# Patient Record
Sex: FEMALE | Race: WHITE | NOT HISPANIC OR LATINO | Employment: OTHER | URBAN - METROPOLITAN AREA
[De-identification: names, ages, dates, MRNs, and addresses within clinical notes are randomized per-mention and may not be internally consistent; named-entity substitution may affect disease eponyms.]

---

## 2023-02-20 PROBLEM — H02.829 CYST, EYELID: Status: ACTIVE | Noted: 2023-02-20

## 2023-02-20 PROBLEM — Z78.0 POSTMENOPAUSAL: Status: ACTIVE | Noted: 2023-02-20

## 2023-02-20 PROBLEM — K21.9 GERD (GASTROESOPHAGEAL REFLUX DISEASE): Status: ACTIVE | Noted: 2023-02-20

## 2023-02-20 PROBLEM — K44.9 HIATAL HERNIA: Status: ACTIVE | Noted: 2023-02-20

## 2023-02-20 PROBLEM — L25.5 RHUS DERMATITIS: Status: ACTIVE | Noted: 2023-02-20

## 2023-02-20 PROBLEM — G43.909 MIGRAINE: Status: ACTIVE | Noted: 2023-02-20

## 2023-02-20 PROBLEM — K63.5 COLON POLYP: Status: ACTIVE | Noted: 2023-02-20

## 2023-02-20 PROBLEM — E66.3 OVERWEIGHT WITH BODY MASS INDEX (BMI) OF 28 TO 28.9 IN ADULT: Status: ACTIVE | Noted: 2023-02-20

## 2023-02-20 PROBLEM — H61.20 CERUMEN IMPACTION: Status: ACTIVE | Noted: 2023-02-20

## 2023-02-20 PROBLEM — L55.9 SUNBURN, UNSPECIFIED: Status: ACTIVE | Noted: 2023-02-20

## 2023-02-20 PROBLEM — L55.9 SUNBURN, UNSPECIFIED: Status: RESOLVED | Noted: 2023-02-20 | Resolved: 2023-02-20

## 2023-02-20 PROBLEM — F41.9 ACUTE ANXIETY: Status: ACTIVE | Noted: 2023-02-20

## 2023-02-20 PROBLEM — I10 HYPERTENSION: Status: ACTIVE | Noted: 2023-02-20

## 2023-02-20 PROBLEM — E55.9 VITAMIN D DEFICIENCY: Status: ACTIVE | Noted: 2023-02-20

## 2023-02-20 PROBLEM — E78.5 HYPERLIPIDEMIA: Status: ACTIVE | Noted: 2023-02-20

## 2023-02-20 PROBLEM — T78.40XA ALLERGIC REACTION: Status: ACTIVE | Noted: 2023-02-20

## 2023-02-20 RX ORDER — PRAVASTATIN SODIUM 80 MG/1
TABLET ORAL
COMMUNITY
Start: 2021-09-20 | End: 2023-03-21 | Stop reason: ALTCHOICE

## 2023-02-20 RX ORDER — ESCITALOPRAM OXALATE 5 MG/1
TABLET ORAL
COMMUNITY
Start: 2020-03-30 | End: 2023-03-21 | Stop reason: SDUPTHER

## 2023-02-20 RX ORDER — HYDROCHLOROTHIAZIDE 25 MG/1
TABLET ORAL
COMMUNITY
Start: 2021-10-21 | End: 2023-09-21 | Stop reason: WASHOUT

## 2023-02-20 RX ORDER — BIOTIN 5 MG
TABLET ORAL
COMMUNITY
End: 2023-07-18 | Stop reason: WASHOUT

## 2023-02-20 RX ORDER — TOBRAMYCIN 3 MG/ML
SOLUTION/ DROPS OPHTHALMIC
COMMUNITY
Start: 2020-09-15 | End: 2023-11-21 | Stop reason: ALTCHOICE

## 2023-02-20 RX ORDER — ELETRIPTAN HYDROBROMIDE 40 MG/1
TABLET, FILM COATED ORAL
COMMUNITY
Start: 2018-02-01 | End: 2023-09-21 | Stop reason: WASHOUT

## 2023-02-20 RX ORDER — HYDROGEN PEROXIDE 3 %
SOLUTION, NON-ORAL MISCELLANEOUS
COMMUNITY
Start: 2018-08-02 | End: 2023-11-21 | Stop reason: ALTCHOICE

## 2023-02-20 RX ORDER — AMLODIPINE BESYLATE 5 MG/1
TABLET ORAL
COMMUNITY
End: 2023-03-21 | Stop reason: SDUPTHER

## 2023-03-21 ENCOUNTER — OFFICE VISIT (OUTPATIENT)
Dept: PRIMARY CARE | Facility: CLINIC | Age: 68
End: 2023-03-21
Payer: MEDICARE

## 2023-03-21 DIAGNOSIS — Z78.0 ASYMPTOMATIC MENOPAUSAL STATE: ICD-10-CM

## 2023-03-21 DIAGNOSIS — F32.89 OTHER DEPRESSION: ICD-10-CM

## 2023-03-21 DIAGNOSIS — Z11.59 NEED FOR HEPATITIS C SCREENING TEST: ICD-10-CM

## 2023-03-21 DIAGNOSIS — Z00.00 ROUTINE GENERAL MEDICAL EXAMINATION AT HEALTH CARE FACILITY: Primary | ICD-10-CM

## 2023-03-21 DIAGNOSIS — E78.2 MIXED HYPERLIPIDEMIA: ICD-10-CM

## 2023-03-21 PROCEDURE — 1160F RVW MEDS BY RX/DR IN RCRD: CPT | Performed by: INTERNAL MEDICINE

## 2023-03-21 PROCEDURE — 1170F FXNL STATUS ASSESSED: CPT | Performed by: INTERNAL MEDICINE

## 2023-03-21 PROCEDURE — G0439 PPPS, SUBSEQ VISIT: HCPCS | Performed by: INTERNAL MEDICINE

## 2023-03-21 PROCEDURE — 3080F DIAST BP >= 90 MM HG: CPT | Performed by: INTERNAL MEDICINE

## 2023-03-21 PROCEDURE — 1036F TOBACCO NON-USER: CPT | Performed by: INTERNAL MEDICINE

## 2023-03-21 PROCEDURE — 3077F SYST BP >= 140 MM HG: CPT | Performed by: INTERNAL MEDICINE

## 2023-03-21 PROCEDURE — 1159F MED LIST DOCD IN RCRD: CPT | Performed by: INTERNAL MEDICINE

## 2023-03-21 RX ORDER — PHENYLPROPANOLAMINE/CLEMASTINE 75-1.34MG
400 TABLET, EXTENDED RELEASE ORAL EVERY 6 HOURS PRN
COMMUNITY

## 2023-03-21 RX ORDER — ESCITALOPRAM OXALATE 5 MG/1
1 TABLET ORAL DAILY
COMMUNITY
Start: 2020-03-30 | End: 2023-03-21 | Stop reason: SDUPTHER

## 2023-03-21 RX ORDER — ATORVASTATIN CALCIUM 80 MG/1
TABLET, FILM COATED ORAL
Qty: 30 TABLET | Refills: 2 | Status: SHIPPED | OUTPATIENT
Start: 2023-03-21 | End: 2023-06-21 | Stop reason: SDUPTHER

## 2023-03-21 RX ORDER — ESCITALOPRAM OXALATE 5 MG/1
5 TABLET ORAL DAILY
Qty: 90 TABLET | Refills: 1 | Status: SHIPPED | OUTPATIENT
Start: 2023-03-21 | End: 2023-09-21 | Stop reason: WASHOUT

## 2023-03-21 RX ORDER — ATORVASTATIN CALCIUM 10 MG/1
80 TABLET, FILM COATED ORAL DAILY
Qty: 240 TABLET | Refills: 2 | Status: SHIPPED | OUTPATIENT
Start: 2023-03-21 | End: 2023-03-21

## 2023-03-21 RX ORDER — CALCIUM CARBONATE 600 MG
600 TABLET ORAL
COMMUNITY
Start: 2021-04-14 | End: 2023-09-21 | Stop reason: WASHOUT

## 2023-03-21 RX ORDER — PRAVASTATIN SODIUM 80 MG/1
80 TABLET ORAL DAILY
COMMUNITY
Start: 2021-09-20 | End: 2023-03-21 | Stop reason: ALTCHOICE

## 2023-03-21 RX ORDER — HYDROGEN PEROXIDE 3 %
SOLUTION, NON-ORAL MISCELLANEOUS
COMMUNITY
Start: 2018-08-02 | End: 2023-09-21 | Stop reason: WASHOUT

## 2023-03-21 RX ORDER — AMLODIPINE BESYLATE 5 MG/1
1 TABLET ORAL DAILY
COMMUNITY
Start: 2023-02-13 | End: 2023-09-21 | Stop reason: SDUPTHER

## 2023-03-21 ASSESSMENT — ACTIVITIES OF DAILY LIVING (ADL)
PATIENT'S MEMORY ADEQUATE TO SAFELY COMPLETE DAILY ACTIVITIES?: YES
HEARING - RIGHT EAR: FUNCTIONAL
GROOMING: INDEPENDENT
BATHING: INDEPENDENT
DRESSING YOURSELF: INDEPENDENT
MANAGING_FINANCES: INDEPENDENT
TOILETING: INDEPENDENT
FEEDING YOURSELF: INDEPENDENT
DOING_HOUSEWORK: INDEPENDENT
JUDGMENT_ADEQUATE_SAFELY_COMPLETE_DAILY_ACTIVITIES: YES
TAKING_MEDICATION: INDEPENDENT
HEARING - LEFT EAR: FUNCTIONAL
BATHING: INDEPENDENT
WALKS IN HOME: INDEPENDENT
DRESSING: INDEPENDENT
ADEQUATE_TO_COMPLETE_ADL: YES
GROCERY_SHOPPING: INDEPENDENT

## 2023-03-21 ASSESSMENT — PATIENT HEALTH QUESTIONNAIRE - PHQ9
2. FEELING DOWN, DEPRESSED OR HOPELESS: NOT AT ALL
2. FEELING DOWN, DEPRESSED OR HOPELESS: NOT AT ALL
SUM OF ALL RESPONSES TO PHQ9 QUESTIONS 1 AND 2: 0
1. LITTLE INTEREST OR PLEASURE IN DOING THINGS: NOT AT ALL
SUM OF ALL RESPONSES TO PHQ9 QUESTIONS 1 AND 2: 0
1. LITTLE INTEREST OR PLEASURE IN DOING THINGS: NOT AT ALL

## 2023-03-21 ASSESSMENT — COLUMBIA-SUICIDE SEVERITY RATING SCALE - C-SSRS
6. HAVE YOU EVER DONE ANYTHING, STARTED TO DO ANYTHING, OR PREPARED TO DO ANYTHING TO END YOUR LIFE?: NO
2. HAVE YOU ACTUALLY HAD ANY THOUGHTS OF KILLING YOURSELF?: NO
1. IN THE PAST MONTH, HAVE YOU WISHED YOU WERE DEAD OR WISHED YOU COULD GO TO SLEEP AND NOT WAKE UP?: NO

## 2023-03-21 ASSESSMENT — ENCOUNTER SYMPTOMS
OCCASIONAL FEELINGS OF UNSTEADINESS: 0
DEPRESSION: 0
LOSS OF SENSATION IN FEET: 0

## 2023-03-21 ASSESSMENT — PAIN SCALES - GENERAL: PAINLEVEL: 0-NO PAIN

## 2023-03-22 VITALS
OXYGEN SATURATION: 99 % | DIASTOLIC BLOOD PRESSURE: 94 MMHG | WEIGHT: 161 LBS | TEMPERATURE: 96.7 F | BODY MASS INDEX: 27.49 KG/M2 | SYSTOLIC BLOOD PRESSURE: 146 MMHG | HEIGHT: 64 IN | HEART RATE: 63 BPM

## 2023-03-25 PROBLEM — E66.3 OVERWEIGHT WITH BODY MASS INDEX (BMI) OF 28 TO 28.9 IN ADULT: Status: RESOLVED | Noted: 2023-02-20 | Resolved: 2023-03-25

## 2023-03-25 ASSESSMENT — ENCOUNTER SYMPTOMS
PALPITATIONS: 0
WOUND: 0
NERVOUS/ANXIOUS: 0
EYE PAIN: 0
POLYPHAGIA: 0
ACTIVITY CHANGE: 0
DIZZINESS: 0
STRIDOR: 0
NAUSEA: 0
DIARRHEA: 0
VOMITING: 0
PHOTOPHOBIA: 0
WHEEZING: 0
WEAKNESS: 0
VOICE CHANGE: 0
SLEEP DISTURBANCE: 0
BLOOD IN STOOL: 0
ADENOPATHY: 0
NUMBNESS: 0
ABDOMINAL PAIN: 0
FLANK PAIN: 0
SHORTNESS OF BREATH: 0
CONFUSION: 0
FATIGUE: 0
UNEXPECTED WEIGHT CHANGE: 0
BACK PAIN: 0
MYALGIAS: 0
SPEECH DIFFICULTY: 0
CONSTIPATION: 0
TROUBLE SWALLOWING: 0
APPETITE CHANGE: 0
CHEST TIGHTNESS: 0
SEIZURES: 0
NECK PAIN: 0
FACIAL ASYMMETRY: 0
SORE THROAT: 0
FEVER: 0
HEADACHES: 0
POLYDIPSIA: 0
SINUS PAIN: 0
HALLUCINATIONS: 0
DYSURIA: 0

## 2023-03-26 NOTE — PROGRESS NOTES
"Subjective   Reason for Visit: Rebeca Penn is an 67 y.o. female here for a Medicare Wellness visit.     HPI  Patient here for Annual Medicare wellness visit and also need refill on Escitalopram for Depression.    Patient Care Team:  Dino Meyer MD as PCP - General  Dino Meyer MD as PCP - [facility] Attributed Provider     Review of Systems   Constitutional:  Negative for activity change, appetite change, fatigue, fever and unexpected weight change.   HENT:  Negative for dental problem, ear discharge, hearing loss, nosebleeds, postnasal drip, sinus pain, sore throat, trouble swallowing and voice change.    Eyes:  Negative for photophobia, pain and visual disturbance.   Respiratory:  Negative for chest tightness, shortness of breath, wheezing and stridor.    Cardiovascular:  Negative for chest pain, palpitations and leg swelling.   Gastrointestinal:  Negative for abdominal pain, blood in stool, constipation, diarrhea, nausea and vomiting.   Endocrine: Negative for polydipsia, polyphagia and polyuria.   Genitourinary:  Negative for decreased urine volume, dyspareunia, dysuria, flank pain and urgency.   Musculoskeletal:  Negative for back pain, gait problem, myalgias and neck pain.   Skin:  Negative for rash and wound.   Allergic/Immunologic: Negative for environmental allergies and food allergies.   Neurological:  Negative for dizziness, seizures, syncope, facial asymmetry, speech difficulty, weakness, numbness and headaches.   Hematological:  Negative for adenopathy.   Psychiatric/Behavioral:  Negative for behavioral problems, confusion, hallucinations, sleep disturbance and suicidal ideas. The patient is not nervous/anxious.      Objective   Vitals:  BP (!) 146/94   Pulse 63   Temp 35.9 °C (96.7 °F)   Ht 1.63 m (5' 4.17\")   Wt 73 kg (161 lb)   SpO2 99%   BMI 27.49 kg/m²       Physical Exam  Constitutional:       General: She is not in acute distress.     Appearance: Normal appearance. She is not ill-appearing " or toxic-appearing.   HENT:      Head: Normocephalic and atraumatic.      Nose: Nose normal.   Eyes:      Extraocular Movements: Extraocular movements intact.      Conjunctiva/sclera: Conjunctivae normal.      Pupils: Pupils are equal, round, and reactive to light.   Cardiovascular:      Rate and Rhythm: Normal rate and regular rhythm.      Pulses: Normal pulses.      Heart sounds: Normal heart sounds. No murmur heard.     No gallop.   Pulmonary:      Effort: No respiratory distress.      Breath sounds: No stridor. No wheezing or rales.   Abdominal:      General: There is no distension.      Tenderness: There is no abdominal tenderness. There is no right CVA tenderness, left CVA tenderness, guarding or rebound.   Musculoskeletal:         General: No swelling or deformity. Normal range of motion.      Cervical back: Normal range of motion and neck supple. No rigidity or tenderness.      Right lower leg: No edema.      Left lower leg: No edema.   Skin:     General: Skin is warm.      Coloration: Skin is not jaundiced.      Findings: No bruising, erythema or rash.   Neurological:      General: No focal deficit present.      Mental Status: She is alert and oriented to person, place, and time.      Cranial Nerves: No cranial nerve deficit.      Gait: Gait normal.   Psychiatric:         Mood and Affect: Mood normal.         Behavior: Behavior normal.         Thought Content: Thought content normal.         Judgment: Judgment normal.       Assessment/Plan   Problem List Items Addressed This Visit          Other    Hyperlipidemia    Relevant Orders    Lipid Panel     Other Visit Diagnoses       Routine general medical examination at health care facility    -  Primary    Need for hepatitis C screening test        Relevant Orders    Hepatitis C antibody    Asymptomatic menopausal state        Relevant Orders    XR DEXA bone density    Other depression        Relevant Medications    escitalopram (Lexapro) 5 mg tablet

## 2023-05-15 ENCOUNTER — TELEPHONE (OUTPATIENT)
Dept: PRIMARY CARE | Facility: CLINIC | Age: 68
End: 2023-05-15
Payer: MEDICARE

## 2023-05-15 NOTE — TELEPHONE ENCOUNTER
"Patient calling in reporting that she has been having itchy skin since being on the Amlodipine back in February. She states that she has been \"putting up with it\" until the last couple of days, she has been having bumps on her skin appearing. She is worried that she may be having an allergic reaction again to the amlodipine. She reports that she has been on the amlodipine 10 mg in the past and ended up having a more severe itching and hives from it.   "

## 2023-05-15 NOTE — TELEPHONE ENCOUNTER
Yes ask her to stop the medicine. It looks like she is having allergic reaction to medication. When she will come to office I will see if I can given alternative medicine.

## 2023-05-16 NOTE — TELEPHONE ENCOUNTER
Patient called back in and states that she forgot that she started taking Biotin 3 weeks ago and after looking it up, she had discovered that some of the side effects of Biotin could be rashes/hives. Patient is going discontinue the Biotin and see if the rash goes away. Patient will call the office back if the rash does not go away to get scheduled with Dr. Meyer to discuss changing Amlodipine.

## 2023-06-21 DIAGNOSIS — E78.2 MIXED HYPERLIPIDEMIA: ICD-10-CM

## 2023-06-21 PROBLEM — R03.0 ELEVATED BLOOD PRESSURE, SITUATIONAL: Status: ACTIVE | Noted: 2023-06-21

## 2023-06-21 PROBLEM — M25.511 PAIN IN RIGHT SHOULDER: Status: ACTIVE | Noted: 2023-06-21

## 2023-06-21 PROBLEM — H57.89 IRRITATION OF RIGHT EYE: Status: ACTIVE | Noted: 2023-06-21

## 2023-06-21 RX ORDER — ATORVASTATIN CALCIUM 80 MG/1
80 TABLET, FILM COATED ORAL DAILY
Qty: 90 TABLET | Refills: 0 | Status: SHIPPED | OUTPATIENT
Start: 2023-06-21 | End: 2023-09-21 | Stop reason: SDUPTHER

## 2023-07-18 ENCOUNTER — OFFICE VISIT (OUTPATIENT)
Dept: PRIMARY CARE | Facility: CLINIC | Age: 68
End: 2023-07-18
Payer: MEDICARE

## 2023-07-18 VITALS
DIASTOLIC BLOOD PRESSURE: 78 MMHG | HEIGHT: 64 IN | SYSTOLIC BLOOD PRESSURE: 118 MMHG | BODY MASS INDEX: 28.34 KG/M2 | OXYGEN SATURATION: 98 % | WEIGHT: 166 LBS | TEMPERATURE: 96.5 F | HEART RATE: 73 BPM

## 2023-07-18 DIAGNOSIS — J40 BRONCHITIS: Primary | ICD-10-CM

## 2023-07-18 PROBLEM — Z78.0 POSTMENOPAUSAL: Status: RESOLVED | Noted: 2023-02-20 | Resolved: 2023-07-18

## 2023-07-18 PROCEDURE — 3078F DIAST BP <80 MM HG: CPT | Performed by: INTERNAL MEDICINE

## 2023-07-18 PROCEDURE — 1160F RVW MEDS BY RX/DR IN RCRD: CPT | Performed by: INTERNAL MEDICINE

## 2023-07-18 PROCEDURE — 3074F SYST BP LT 130 MM HG: CPT | Performed by: INTERNAL MEDICINE

## 2023-07-18 PROCEDURE — 1126F AMNT PAIN NOTED NONE PRSNT: CPT | Performed by: INTERNAL MEDICINE

## 2023-07-18 PROCEDURE — 99212 OFFICE O/P EST SF 10 MIN: CPT | Performed by: INTERNAL MEDICINE

## 2023-07-18 PROCEDURE — 1159F MED LIST DOCD IN RCRD: CPT | Performed by: INTERNAL MEDICINE

## 2023-07-18 PROCEDURE — 1036F TOBACCO NON-USER: CPT | Performed by: INTERNAL MEDICINE

## 2023-07-18 RX ORDER — AZITHROMYCIN 250 MG/1
TABLET, FILM COATED ORAL
Qty: 6 TABLET | Refills: 0 | Status: SHIPPED | OUTPATIENT
Start: 2023-07-18 | End: 2023-07-23

## 2023-07-18 ASSESSMENT — ENCOUNTER SYMPTOMS
COUGH: 1
ADENOPATHY: 0
BLOOD IN STOOL: 0
MYALGIAS: 1
EYE PAIN: 0
POLYDIPSIA: 0
DYSURIA: 0
CHEST TIGHTNESS: 0
HEMATURIA: 0
SHORTNESS OF BREATH: 1
BACK PAIN: 0
FEVER: 0
WHEEZING: 1
NUMBNESS: 0
SLEEP DISTURBANCE: 0
NECK PAIN: 0
TROUBLE SWALLOWING: 0
VOMITING: 0
DIZZINESS: 0
SEIZURES: 0
DIARRHEA: 0
UNEXPECTED WEIGHT CHANGE: 0
VOICE CHANGE: 0
FATIGUE: 1
ACTIVITY CHANGE: 0
SORE THROAT: 0
WOUND: 0
NERVOUS/ANXIOUS: 0
STRIDOR: 0
HALLUCINATIONS: 0
FLANK PAIN: 0
CONSTIPATION: 0
APPETITE CHANGE: 0
WEAKNESS: 1
PHOTOPHOBIA: 0
SPEECH DIFFICULTY: 0
PALPITATIONS: 0
FACIAL ASYMMETRY: 0
ABDOMINAL PAIN: 0
HEADACHES: 0
CONFUSION: 0
SINUS PAIN: 0
NAUSEA: 0
POLYPHAGIA: 0

## 2023-07-18 ASSESSMENT — PAIN SCALES - GENERAL: PAINLEVEL: 0-NO PAIN

## 2023-07-18 NOTE — PROGRESS NOTES
"Subjective   Patient ID: Rebeca Penn is a 67 y.o. female who presents for Cough (Coughing, wheezing for the last week).    HPI   Patient have one week history of Cough and wheezing which is not improving. Her daughter visited couple of weeks ago and she had walking pneumonia. Otherwise she does not recall any exposure.  Cough is intermittent but frequent with occasional sputum.    Review of Systems   Constitutional:  Positive for fatigue. Negative for activity change, appetite change, fever and unexpected weight change.   HENT:  Negative for dental problem, ear discharge, hearing loss, nosebleeds, postnasal drip, sinus pain, sore throat, trouble swallowing and voice change.    Eyes:  Negative for photophobia, pain and visual disturbance.   Respiratory:  Positive for cough, shortness of breath and wheezing. Negative for chest tightness and stridor.    Cardiovascular:  Negative for chest pain, palpitations and leg swelling.   Gastrointestinal:  Negative for abdominal pain, blood in stool, constipation, diarrhea, nausea and vomiting.   Endocrine: Negative for polydipsia, polyphagia and polyuria.   Genitourinary:  Negative for decreased urine volume, dyspareunia, dysuria, flank pain, hematuria and urgency.   Musculoskeletal:  Positive for myalgias. Negative for back pain, gait problem and neck pain.   Skin:  Negative for rash and wound.   Allergic/Immunologic: Negative for environmental allergies and food allergies.   Neurological:  Positive for weakness. Negative for dizziness, seizures, syncope, facial asymmetry, speech difficulty, numbness and headaches.   Hematological:  Negative for adenopathy.   Psychiatric/Behavioral:  Negative for behavioral problems, confusion, hallucinations, sleep disturbance and suicidal ideas. The patient is not nervous/anxious.        Objective   /78   Pulse 73   Temp 35.8 °C (96.5 °F)   Ht 1.626 m (5' 4\")   Wt 75.3 kg (166 lb)   SpO2 98%   BMI 28.49 kg/m²     Physical " Exam  Constitutional:       General: She is not in acute distress.     Appearance: Normal appearance. She is not ill-appearing or toxic-appearing.   HENT:      Head: Normocephalic.   Eyes:      General:         Right eye: No discharge.         Left eye: No discharge.      Conjunctiva/sclera: Conjunctivae normal.   Cardiovascular:      Rate and Rhythm: Normal rate and regular rhythm.      Pulses: Normal pulses.      Heart sounds: Normal heart sounds. No murmur heard.  Pulmonary:      Effort: Pulmonary effort is normal. No respiratory distress.      Breath sounds: No stridor. No wheezing or rales.      Comments: Bilateral diffuse bronchial breath sounds are present  Abdominal:      General: Bowel sounds are normal. There is no distension.      Palpations: Abdomen is soft.      Tenderness: There is no abdominal tenderness. There is no right CVA tenderness, left CVA tenderness, guarding or rebound.   Musculoskeletal:         General: No deformity. Normal range of motion.      Cervical back: Normal range of motion.   Skin:     General: Skin is warm.   Neurological:      General: No focal deficit present.      Mental Status: She is alert and oriented to person, place, and time.   Psychiatric:         Mood and Affect: Mood normal.         Behavior: Behavior normal.         Thought Content: Thought content normal.         Judgment: Judgment normal.       Assessment/Plan   Problem List Items Addressed This Visit    None  Visit Diagnoses       Bronchitis    -  Primary    Relevant Medications    azithromycin (Zithromax) 250 mg tablet

## 2023-09-13 ENCOUNTER — LAB (OUTPATIENT)
Dept: LAB | Facility: LAB | Age: 68
End: 2023-09-13
Payer: MEDICARE

## 2023-09-13 DIAGNOSIS — Z11.59 NEED FOR HEPATITIS C SCREENING TEST: ICD-10-CM

## 2023-09-13 DIAGNOSIS — E78.2 MIXED HYPERLIPIDEMIA: ICD-10-CM

## 2023-09-13 LAB
CHOLESTEROL (MG/DL) IN SER/PLAS: 200 MG/DL (ref 0–199)
CHOLESTEROL IN HDL (MG/DL) IN SER/PLAS: 52.4 MG/DL
CHOLESTEROL/HDL RATIO: 3.8
HEPATITIS C VIRUS AB PRESENCE IN SERUM: NONREACTIVE
LDL: 117 MG/DL (ref 0–99)
TRIGLYCERIDE (MG/DL) IN SER/PLAS: 154 MG/DL (ref 0–149)
VLDL: 31 MG/DL (ref 0–40)

## 2023-09-13 PROCEDURE — 86803 HEPATITIS C AB TEST: CPT

## 2023-09-13 PROCEDURE — 80061 LIPID PANEL: CPT

## 2023-09-13 PROCEDURE — 36415 COLL VENOUS BLD VENIPUNCTURE: CPT

## 2023-09-14 DIAGNOSIS — Z12.31 BREAST CANCER SCREENING BY MAMMOGRAM: Primary | ICD-10-CM

## 2023-09-21 ENCOUNTER — OFFICE VISIT (OUTPATIENT)
Dept: PRIMARY CARE | Facility: CLINIC | Age: 68
End: 2023-09-21
Payer: MEDICARE

## 2023-09-21 VITALS
OXYGEN SATURATION: 97 % | WEIGHT: 168 LBS | BODY MASS INDEX: 28.68 KG/M2 | SYSTOLIC BLOOD PRESSURE: 130 MMHG | HEIGHT: 64 IN | DIASTOLIC BLOOD PRESSURE: 82 MMHG | HEART RATE: 70 BPM | TEMPERATURE: 96.8 F

## 2023-09-21 DIAGNOSIS — E66.3 OVERWEIGHT WITH BODY MASS INDEX (BMI) OF 28 TO 28.9 IN ADULT: ICD-10-CM

## 2023-09-21 DIAGNOSIS — K21.9 GASTROESOPHAGEAL REFLUX DISEASE WITHOUT ESOPHAGITIS: ICD-10-CM

## 2023-09-21 DIAGNOSIS — E78.2 MIXED HYPERLIPIDEMIA: ICD-10-CM

## 2023-09-21 DIAGNOSIS — H61.23 BILATERAL IMPACTED CERUMEN: ICD-10-CM

## 2023-09-21 DIAGNOSIS — I10 PRIMARY HYPERTENSION: Primary | ICD-10-CM

## 2023-09-21 PROBLEM — M25.511 PAIN IN RIGHT SHOULDER: Status: RESOLVED | Noted: 2023-06-21 | Resolved: 2023-09-21

## 2023-09-21 PROBLEM — R03.0 ELEVATED BLOOD PRESSURE, SITUATIONAL: Status: RESOLVED | Noted: 2023-06-21 | Resolved: 2023-09-21

## 2023-09-21 PROBLEM — H61.20 CERUMEN IMPACTION: Status: RESOLVED | Noted: 2023-02-20 | Resolved: 2023-09-21

## 2023-09-21 PROBLEM — G43.909 MIGRAINE: Status: RESOLVED | Noted: 2023-02-20 | Resolved: 2023-09-21

## 2023-09-21 PROCEDURE — 1036F TOBACCO NON-USER: CPT | Performed by: INTERNAL MEDICINE

## 2023-09-21 PROCEDURE — 3008F BODY MASS INDEX DOCD: CPT | Performed by: INTERNAL MEDICINE

## 2023-09-21 PROCEDURE — 1160F RVW MEDS BY RX/DR IN RCRD: CPT | Performed by: INTERNAL MEDICINE

## 2023-09-21 PROCEDURE — 99214 OFFICE O/P EST MOD 30 MIN: CPT | Performed by: INTERNAL MEDICINE

## 2023-09-21 PROCEDURE — 3075F SYST BP GE 130 - 139MM HG: CPT | Performed by: INTERNAL MEDICINE

## 2023-09-21 PROCEDURE — 3079F DIAST BP 80-89 MM HG: CPT | Performed by: INTERNAL MEDICINE

## 2023-09-21 PROCEDURE — 1159F MED LIST DOCD IN RCRD: CPT | Performed by: INTERNAL MEDICINE

## 2023-09-21 PROCEDURE — 1126F AMNT PAIN NOTED NONE PRSNT: CPT | Performed by: INTERNAL MEDICINE

## 2023-09-21 RX ORDER — AMLODIPINE BESYLATE 5 MG/1
5 TABLET ORAL DAILY
Qty: 90 TABLET | Refills: 1 | Status: SHIPPED | OUTPATIENT
Start: 2023-09-21 | End: 2024-04-26 | Stop reason: SDUPTHER

## 2023-09-21 RX ORDER — ATORVASTATIN CALCIUM 80 MG/1
80 TABLET, FILM COATED ORAL DAILY
Qty: 90 TABLET | Refills: 0 | Status: SHIPPED | OUTPATIENT
Start: 2023-09-21 | End: 2024-01-23 | Stop reason: SDUPTHER

## 2023-09-21 ASSESSMENT — ENCOUNTER SYMPTOMS
HALLUCINATIONS: 0
PHOTOPHOBIA: 0
ADENOPATHY: 0
BLOOD IN STOOL: 0
BACK PAIN: 0
ACTIVITY CHANGE: 0
FATIGUE: 0
HEMATURIA: 0
TROUBLE SWALLOWING: 0
NAUSEA: 0
APPETITE CHANGE: 0
SLEEP DISTURBANCE: 0
NUMBNESS: 0
COUGH: 1
DIARRHEA: 0
FREQUENCY: 0
SINUS PAIN: 0
DIZZINESS: 0
CHEST TIGHTNESS: 0
NERVOUS/ANXIOUS: 0
POLYPHAGIA: 0
ABDOMINAL PAIN: 0
SPEECH DIFFICULTY: 0
DYSURIA: 0
SHORTNESS OF BREATH: 0
STRIDOR: 0
CONSTIPATION: 0
UNEXPECTED WEIGHT CHANGE: 0
FLANK PAIN: 0
POLYDIPSIA: 0
FEVER: 0
FACIAL ASYMMETRY: 0
WHEEZING: 0
VOMITING: 0
WEAKNESS: 0
SEIZURES: 0
MYALGIAS: 0
VOICE CHANGE: 0
CONFUSION: 0
COLOR CHANGE: 0
WOUND: 0
NECK PAIN: 0
EYE PAIN: 0
PALPITATIONS: 0
TREMORS: 0
SORE THROAT: 0
ARTHRALGIAS: 0
HEADACHES: 0
JOINT SWELLING: 0

## 2023-09-21 ASSESSMENT — PATIENT HEALTH QUESTIONNAIRE - PHQ9
1. LITTLE INTEREST OR PLEASURE IN DOING THINGS: NOT AT ALL
2. FEELING DOWN, DEPRESSED OR HOPELESS: NOT AT ALL
SUM OF ALL RESPONSES TO PHQ9 QUESTIONS 1 & 2: 0

## 2023-09-21 NOTE — ASSESSMENT & PLAN NOTE
Patient is on Nexium every other day. She is taking it every other day and symptoms are controlled.

## 2023-09-21 NOTE — ASSESSMENT & PLAN NOTE
Bilateral ear flushing attempted today. Cerumen is hard so its unsuccessful, Patient asked to use wax dissolving ear drop and asked her to come back in 5 days.

## 2023-09-21 NOTE — PROGRESS NOTES
"Subjective   Patient ID: Rebeca Penn is a 68 y.o. female who presents for Follow-up (Medicine refill and recently had labs) and Ear Fullness (Right sided ear fullness).    HPI   Patient presented to the office for refill on medicine and to discuss labs which she recently had last week.  Also patient has right side ear fullness without ear pain and discharge.    Review of Systems   Constitutional:  Negative for activity change, appetite change, fatigue, fever and unexpected weight change.   HENT:  Negative for dental problem, ear discharge, hearing loss, nosebleeds, postnasal drip, sinus pain, sore throat, trouble swallowing and voice change.         Right sided ear fullness   Eyes:  Negative for photophobia, pain and visual disturbance.   Respiratory:  Positive for cough. Negative for chest tightness, shortness of breath, wheezing and stridor.    Cardiovascular:  Negative for chest pain, palpitations and leg swelling.   Gastrointestinal:  Negative for abdominal pain, blood in stool, constipation, diarrhea, nausea and vomiting.   Endocrine: Negative for polydipsia, polyphagia and polyuria.   Genitourinary:  Negative for decreased urine volume, dyspareunia, dysuria, flank pain, frequency, hematuria and urgency.   Musculoskeletal:  Negative for arthralgias, back pain, gait problem, joint swelling, myalgias and neck pain.   Skin:  Negative for color change, rash and wound.   Allergic/Immunologic: Negative for environmental allergies and food allergies.   Neurological:  Negative for dizziness, tremors, seizures, syncope, facial asymmetry, speech difficulty, weakness, numbness and headaches.   Hematological:  Negative for adenopathy.   Psychiatric/Behavioral:  Negative for behavioral problems, confusion, hallucinations, self-injury, sleep disturbance and suicidal ideas. The patient is not nervous/anxious.      Objective   /82   Pulse 70   Temp 36 °C (96.8 °F)   Ht 1.626 m (5' 4\")   Wt 76.2 kg (168 lb)   SpO2 " 97%   BMI 28.84 kg/m²     Physical Exam  Vitals and nursing note reviewed.   Constitutional:       General: She is not in acute distress.     Appearance: Normal appearance. She is not ill-appearing or toxic-appearing.   HENT:      Head: Normocephalic and atraumatic.      Right Ear: Ear canal and external ear normal. There is impacted cerumen.      Left Ear: Ear canal and external ear normal. There is impacted cerumen.      Nose: Nose normal.   Eyes:      General:         Right eye: No discharge.         Left eye: No discharge.      Extraocular Movements: Extraocular movements intact.      Conjunctiva/sclera: Conjunctivae normal.      Pupils: Pupils are equal, round, and reactive to light.   Cardiovascular:      Rate and Rhythm: Normal rate and regular rhythm.      Pulses: Normal pulses.      Heart sounds: Normal heart sounds. No murmur heard.     No gallop.   Pulmonary:      Effort: Pulmonary effort is normal. No respiratory distress.      Breath sounds: Normal breath sounds. No stridor. No wheezing or rales.   Abdominal:      General: Bowel sounds are normal.      Tenderness: There is no abdominal tenderness.   Musculoskeletal:         General: No swelling or deformity. Normal range of motion.      Cervical back: Normal range of motion and neck supple. No tenderness.      Right lower leg: No edema.      Left lower leg: No edema.   Skin:     General: Skin is warm.      Coloration: Skin is not jaundiced.      Findings: No bruising, erythema or rash.   Neurological:      General: No focal deficit present.      Mental Status: She is alert and oriented to person, place, and time.      Cranial Nerves: No cranial nerve deficit.      Gait: Gait normal.   Psychiatric:         Mood and Affect: Mood normal.         Behavior: Behavior normal.         Thought Content: Thought content normal.         Judgment: Judgment normal.       Assessment/Plan   Problem List Items Addressed This Visit          Cardiac and Vasculature     Hyperlipidemia     Significant improvement in LDL from 161 to 117. Continue Atorvastatin. Medicine has been renewed.         Relevant Medications    atorvastatin (Lipitor) 80 mg tablet    Hypertension - Primary     BP is 130/82. Continue Amlodipine.         Relevant Medications    amLODIPine (Norvasc) 5 mg tablet       ENT    Bilateral impacted cerumen     Bilateral ear flushing attempted today. Cerumen is hard so its unsuccessful, Patient asked to use wax dissolving ear drop and asked her to come back in 5 days.            Endocrine/Metabolic    Overweight with body mass index (BMI) of 28 to 28.9 in adult     - Lifestyle modification which include daily exercise at least for 45 minute and Low Calorie intake of 1800- 2000 Kcal per day.            Gastrointestinal and Abdominal    GERD (gastroesophageal reflux disease)     Patient is on Nexium every other day. She is taking it every other day and symptoms are controlled.          Return to clinic in 6 months for Medicare wellness exam.

## 2023-09-27 ENCOUNTER — OFFICE VISIT (OUTPATIENT)
Dept: PRIMARY CARE | Facility: CLINIC | Age: 68
End: 2023-09-27
Payer: MEDICARE

## 2023-09-27 VITALS
SYSTOLIC BLOOD PRESSURE: 126 MMHG | TEMPERATURE: 97 F | HEART RATE: 63 BPM | DIASTOLIC BLOOD PRESSURE: 86 MMHG | OXYGEN SATURATION: 96 %

## 2023-09-27 DIAGNOSIS — Z12.31 BREAST CANCER SCREENING BY MAMMOGRAM: ICD-10-CM

## 2023-09-27 DIAGNOSIS — H61.23 BILATERAL IMPACTED CERUMEN: Primary | ICD-10-CM

## 2023-09-27 PROCEDURE — 1159F MED LIST DOCD IN RCRD: CPT | Performed by: INTERNAL MEDICINE

## 2023-09-27 PROCEDURE — 3079F DIAST BP 80-89 MM HG: CPT | Performed by: INTERNAL MEDICINE

## 2023-09-27 PROCEDURE — 1126F AMNT PAIN NOTED NONE PRSNT: CPT | Performed by: INTERNAL MEDICINE

## 2023-09-27 PROCEDURE — 1036F TOBACCO NON-USER: CPT | Performed by: INTERNAL MEDICINE

## 2023-09-27 PROCEDURE — 3008F BODY MASS INDEX DOCD: CPT | Performed by: INTERNAL MEDICINE

## 2023-09-27 PROCEDURE — 99212 OFFICE O/P EST SF 10 MIN: CPT | Performed by: INTERNAL MEDICINE

## 2023-09-27 PROCEDURE — 3074F SYST BP LT 130 MM HG: CPT | Performed by: INTERNAL MEDICINE

## 2023-09-27 PROCEDURE — 1160F RVW MEDS BY RX/DR IN RCRD: CPT | Performed by: INTERNAL MEDICINE

## 2023-09-27 ASSESSMENT — ENCOUNTER SYMPTOMS
FEVER: 0
CONSTIPATION: 0
PHOTOPHOBIA: 0
COUGH: 0
POLYDIPSIA: 0
SEIZURES: 0
FLANK PAIN: 0
SORE THROAT: 0
WOUND: 0
APPETITE CHANGE: 0
TROUBLE SWALLOWING: 0
HALLUCINATIONS: 0
COLOR CHANGE: 0
WHEEZING: 0
NERVOUS/ANXIOUS: 0
STRIDOR: 0
SHORTNESS OF BREATH: 0
EYE PAIN: 0
DYSURIA: 0
CHEST TIGHTNESS: 0
ADENOPATHY: 0
HEMATURIA: 0
ARTHRALGIAS: 0
TREMORS: 0
BACK PAIN: 0
CONFUSION: 0
NUMBNESS: 0
ABDOMINAL PAIN: 0
BLOOD IN STOOL: 0
SLEEP DISTURBANCE: 0
VOMITING: 0
WEAKNESS: 0
JOINT SWELLING: 0
FATIGUE: 0
DIARRHEA: 0
POLYPHAGIA: 0
NECK PAIN: 0
ACTIVITY CHANGE: 0
SINUS PAIN: 0
VOICE CHANGE: 0
UNEXPECTED WEIGHT CHANGE: 0
PALPITATIONS: 0
SPEECH DIFFICULTY: 0
FACIAL ASYMMETRY: 0
MYALGIAS: 0
DIZZINESS: 0
NAUSEA: 0
HEADACHES: 0

## 2023-09-27 ASSESSMENT — PAIN SCALES - GENERAL: PAINLEVEL: 0-NO PAIN

## 2023-09-27 NOTE — PROGRESS NOTES
Subjective   Patient ID: Rebeca Penn is a 68 y.o. female who presents for Cerumen Impaction.    HPI   Patient presented to the office for removal of cerumen and she was using ear drop to soften it up.    Review of Systems   Constitutional:  Negative for activity change, appetite change, fatigue, fever and unexpected weight change.   HENT:  Positive for ear pain. Negative for dental problem, ear discharge, hearing loss, nosebleeds, postnasal drip, sinus pain, sore throat, trouble swallowing and voice change.    Eyes:  Negative for photophobia, pain and visual disturbance.   Respiratory:  Negative for cough, chest tightness, shortness of breath, wheezing and stridor.    Cardiovascular:  Negative for chest pain, palpitations and leg swelling.   Gastrointestinal:  Negative for abdominal pain, blood in stool, constipation, diarrhea, nausea and vomiting.   Endocrine: Negative for polydipsia, polyphagia and polyuria.   Genitourinary:  Negative for decreased urine volume, dyspareunia, dysuria, flank pain, hematuria and urgency.   Musculoskeletal:  Negative for arthralgias, back pain, gait problem, joint swelling, myalgias and neck pain.   Skin:  Negative for color change, rash and wound.   Allergic/Immunologic: Negative for environmental allergies and food allergies.   Neurological:  Negative for dizziness, tremors, seizures, syncope, facial asymmetry, speech difficulty, weakness, numbness and headaches.   Hematological:  Negative for adenopathy.   Psychiatric/Behavioral:  Negative for behavioral problems, confusion, hallucinations, self-injury, sleep disturbance and suicidal ideas. The patient is not nervous/anxious.      Objective   /86   Pulse 63   Temp 36.1 °C (97 °F)   SpO2 96%     Physical Exam  Constitutional:       General: She is not in acute distress.     Appearance: Normal appearance. She is not ill-appearing or toxic-appearing.   HENT:      Right Ear: There is impacted cerumen.      Left Ear: There is  impacted cerumen.   Pulmonary:      Effort: Pulmonary effort is normal.   Musculoskeletal:         General: Normal range of motion.      Cervical back: Normal range of motion.   Skin:     General: Skin is warm.   Neurological:      General: No focal deficit present.      Mental Status: She is alert and oriented to person, place, and time.   Psychiatric:         Mood and Affect: Mood normal.         Behavior: Behavior normal.         Thought Content: Thought content normal.         Judgment: Judgment normal.       Assessment/Plan   Problem List Items Addressed This Visit          ENT    Bilateral impacted cerumen - Primary     Irrigation of EAC of both ears done and significant wax came out. Right more than left and patient feels better after the flushing.          Other Visit Diagnoses       Breast cancer screening by mammogram        Relevant Orders    BI mammo bilateral screening tomosynthesis

## 2023-09-27 NOTE — ASSESSMENT & PLAN NOTE
Irrigation of EAC of both ears done and significant wax came out. Right more than left and patient feels better after the flushing.

## 2023-10-03 ENCOUNTER — HOSPITAL ENCOUNTER (OUTPATIENT)
Dept: RADIOLOGY | Facility: HOSPITAL | Age: 68
Discharge: HOME | End: 2023-10-03
Payer: MEDICARE

## 2023-10-03 DIAGNOSIS — Z12.31 BREAST CANCER SCREENING BY MAMMOGRAM: ICD-10-CM

## 2023-10-03 PROCEDURE — 77063 BREAST TOMOSYNTHESIS BI: CPT | Mod: 50

## 2023-10-03 PROCEDURE — 77067 SCR MAMMO BI INCL CAD: CPT | Mod: BILATERAL PROCEDURE | Performed by: RADIOLOGY

## 2023-10-03 PROCEDURE — 77063 BREAST TOMOSYNTHESIS BI: CPT | Mod: BILATERAL PROCEDURE | Performed by: RADIOLOGY

## 2023-10-04 DIAGNOSIS — N63.20 MASS OF LEFT BREAST, UNSPECIFIED QUADRANT: Primary | ICD-10-CM

## 2023-10-09 ENCOUNTER — ANCILLARY PROCEDURE (OUTPATIENT)
Dept: RADIOLOGY | Facility: HOSPITAL | Age: 68
End: 2023-10-09
Payer: MEDICARE

## 2023-10-09 ENCOUNTER — OFFICE VISIT (OUTPATIENT)
Dept: SURGERY | Facility: CLINIC | Age: 68
End: 2023-10-09
Payer: MEDICARE

## 2023-10-09 DIAGNOSIS — N63.20 MASS OF LEFT BREAST, UNSPECIFIED QUADRANT: Primary | ICD-10-CM

## 2023-10-09 DIAGNOSIS — N63.0 BREAST MASS IN FEMALE: Primary | ICD-10-CM

## 2023-10-09 DIAGNOSIS — N63.20 MASS OF LEFT BREAST, UNSPECIFIED QUADRANT: ICD-10-CM

## 2023-10-09 PROCEDURE — 76642 ULTRASOUND BREAST LIMITED: CPT | Mod: LT

## 2023-10-09 PROCEDURE — 76982 USE 1ST TARGET LESION: CPT

## 2023-10-09 PROCEDURE — 76642 ULTRASOUND BREAST LIMITED: CPT | Mod: LEFT SIDE | Performed by: RADIOLOGY

## 2023-10-09 NOTE — H&P
History Of Present Illness  Rebeca Penn is a 68 y.o. female presenting with abnormal Mammogram and ultrasound that it is now recommending that she have a biopsy done. Pt is here for her H&P prior to biopsy.  She has a sister who had a mastectomy for breast cancer.   Pt had a lumpectomy in left breast in 1981 that turned out to be benign      Past Medical History  Past Medical History:   Diagnosis Date    Migraine 02/20/2023    Pain in unspecified foot 06/09/2020    Foot pain    Pain in unspecified foot 06/09/2020    Foot pain    Personal history of other diseases of the digestive system 11/01/2019    History of gastroesophageal reflux (GERD)    Personal history of other endocrine, nutritional and metabolic disease 10/28/2019    History of hyperlipidemia    Personal history of other infectious and parasitic diseases 07/16/2020    History of herpes zoster       Surgical History  Past Surgical History:   Procedure Laterality Date    BREAST BIOPSY      BREAST BIOPSY Left 01/01/1981    bgn exci    OTHER SURGICAL HISTORY  11/01/2019    Bunionectomy    OTHER SURGICAL HISTORY  11/01/2019    Carpal tunnel surgery        Social History  She reports that she has never smoked. She has been exposed to tobacco smoke. She has never used smokeless tobacco. She reports that she does not currently use alcohol. She reports that she does not use drugs.    Family History  Family History   Problem Relation Name Age of Onset    Diabetes Mother      Other (cardiac disorder) Father      Breast cancer Sister  60        Allergies  Amlodipine, Lisinopril, Naproxen sodium, and Prochlorperazine    Review of Systems   All other systems reviewed and are negative.       Physical Exam  Constitutional:       Appearance: Normal appearance.   HENT:      Head: Normocephalic.      Mouth/Throat:      Mouth: Mucous membranes are moist.   Eyes:      Pupils: Pupils are equal, round, and reactive to light.   Cardiovascular:      Rate and Rhythm: Normal rate  and regular rhythm.   Chest:      Comments: Stevenson breast symmetrical no skin changes, no nipple inversion or drainage, no palpable masses noted, no axilla or supraclavicular masses noted.   Musculoskeletal:         General: Normal range of motion.      Cervical back: Normal range of motion.   Skin:     General: Skin is warm and dry.   Neurological:      Mental Status: She is alert.   Psychiatric:         Mood and Affect: Mood normal.          Last Recorded Vitals  There were no vitals taken for this visit.    Relevant Results             Assessment/Plan   Problem List Items Addressed This Visit    None      Pt is agreeable to proceed with the left breast biopsy  Will call pt with results once available        I spent 30 minutes in the professional and overall care of this patient.      Valeria Perez, APRN-CNP

## 2023-10-10 NOTE — PROGRESS NOTES
Pt at diagnostic mammogram and radiology requesting biopsy of left breast mass, pt here for H&P prior to biopsy

## 2023-10-19 ENCOUNTER — HOSPITAL ENCOUNTER (OUTPATIENT)
Dept: RADIOLOGY | Facility: HOSPITAL | Age: 68
Discharge: HOME | End: 2023-10-19
Payer: MEDICARE

## 2023-10-19 ENCOUNTER — ANCILLARY PROCEDURE (OUTPATIENT)
Dept: RADIOLOGY | Facility: HOSPITAL | Age: 68
End: 2023-10-19
Payer: MEDICARE

## 2023-10-19 VITALS
RESPIRATION RATE: 18 BRPM | SYSTOLIC BLOOD PRESSURE: 157 MMHG | OXYGEN SATURATION: 98 % | HEART RATE: 73 BPM | DIASTOLIC BLOOD PRESSURE: 72 MMHG

## 2023-10-19 DIAGNOSIS — N63.0 BREAST MASS IN FEMALE: ICD-10-CM

## 2023-10-19 PROCEDURE — 2720000007 HC OR 272 NO HCPCS

## 2023-10-19 PROCEDURE — 19083 BX BREAST 1ST LESION US IMAG: CPT | Mod: LEFT SIDE | Performed by: RADIOLOGY

## 2023-10-19 PROCEDURE — 77065 DX MAMMO INCL CAD UNI: CPT | Mod: LEFT SIDE | Performed by: RADIOLOGY

## 2023-10-19 PROCEDURE — 19083 BX BREAST 1ST LESION US IMAG: CPT | Mod: LT

## 2023-10-19 PROCEDURE — 88305 TISSUE EXAM BY PATHOLOGIST: CPT | Performed by: PATHOLOGY

## 2023-10-19 PROCEDURE — 77065 DX MAMMO INCL CAD UNI: CPT | Mod: LT

## 2023-10-19 PROCEDURE — 88305 TISSUE EXAM BY PATHOLOGIST: CPT | Mod: TC,SUR,GEALAB | Performed by: NURSE PRACTITIONER

## 2023-10-19 PROCEDURE — 88305 TISSUE EXAM BY PATHOLOGIST: CPT | Mod: TC,GEALAB | Performed by: NURSE PRACTITIONER

## 2023-10-19 NOTE — DISCHARGE INSTRUCTIONS
Okay to use ice as needed for pain.  Okay to take tylenol as needed for pain.  Okay to remove band-aid tomorrow.  Steri-strips will fall off on own.  Okay to shower tomorrow.  Follow up with Valeria Perez in 10 days for results.  Okay to resume normal diet.   Notify MD with any s/s of infection or active bleeding.

## 2023-10-25 LAB
LABORATORY COMMENT REPORT: NORMAL
PATH REPORT.FINAL DX SPEC: NORMAL
PATH REPORT.GROSS SPEC: NORMAL
PATH REPORT.TOTAL CANCER: NORMAL

## 2023-11-08 ENCOUNTER — ANCILLARY PROCEDURE (OUTPATIENT)
Dept: RADIOLOGY | Facility: HOSPITAL | Age: 68
End: 2023-11-08
Payer: MEDICARE

## 2023-11-08 ENCOUNTER — APPOINTMENT (OUTPATIENT)
Dept: RADIOLOGY | Facility: HOSPITAL | Age: 68
End: 2023-11-08
Payer: MEDICARE

## 2023-11-08 ENCOUNTER — HOSPITAL ENCOUNTER (EMERGENCY)
Facility: HOSPITAL | Age: 68
Discharge: HOME | End: 2023-11-08
Payer: MEDICARE

## 2023-11-08 VITALS
BODY MASS INDEX: 27.49 KG/M2 | DIASTOLIC BLOOD PRESSURE: 78 MMHG | OXYGEN SATURATION: 96 % | HEIGHT: 65 IN | WEIGHT: 165 LBS | HEART RATE: 73 BPM | TEMPERATURE: 98.4 F | RESPIRATION RATE: 18 BRPM | SYSTOLIC BLOOD PRESSURE: 159 MMHG

## 2023-11-08 DIAGNOSIS — R10.11 RUQ PAIN: Primary | ICD-10-CM

## 2023-11-08 LAB
ALBUMIN SERPL BCP-MCNC: 4 G/DL (ref 3.4–5)
ALP SERPL-CCNC: 71 U/L (ref 33–136)
ALT SERPL W P-5'-P-CCNC: 15 U/L (ref 7–45)
ANION GAP SERPL CALC-SCNC: 12 MMOL/L (ref 10–20)
AST SERPL W P-5'-P-CCNC: 26 U/L (ref 9–39)
BASOPHILS # BLD AUTO: 0.09 X10*3/UL (ref 0–0.1)
BASOPHILS NFR BLD AUTO: 1.3 %
BILIRUB SERPL-MCNC: 0.5 MG/DL (ref 0–1.2)
BUN SERPL-MCNC: 17 MG/DL (ref 6–23)
CALCIUM SERPL-MCNC: 8.9 MG/DL (ref 8.6–10.3)
CARDIAC TROPONIN I PNL SERPL HS: <3 NG/L (ref 0–13)
CHLORIDE SERPL-SCNC: 104 MMOL/L (ref 98–107)
CO2 SERPL-SCNC: 27 MMOL/L (ref 21–32)
CREAT SERPL-MCNC: 0.9 MG/DL (ref 0.5–1.05)
EOSINOPHIL # BLD AUTO: 0.15 X10*3/UL (ref 0–0.7)
EOSINOPHIL NFR BLD AUTO: 2.2 %
ERYTHROCYTE [DISTWIDTH] IN BLOOD BY AUTOMATED COUNT: 13.8 % (ref 11.5–14.5)
GFR SERPL CREATININE-BSD FRML MDRD: 69 ML/MIN/1.73M*2
GLUCOSE SERPL-MCNC: 93 MG/DL (ref 74–99)
HCT VFR BLD AUTO: 43.5 % (ref 36–46)
HGB BLD-MCNC: 13.7 G/DL (ref 12–16)
IMM GRANULOCYTES # BLD AUTO: 0.02 X10*3/UL (ref 0–0.7)
IMM GRANULOCYTES NFR BLD AUTO: 0.3 % (ref 0–0.9)
LIPASE SERPL-CCNC: 27 U/L (ref 9–82)
LYMPHOCYTES # BLD AUTO: 1.87 X10*3/UL (ref 1.2–4.8)
LYMPHOCYTES NFR BLD AUTO: 27 %
MCH RBC QN AUTO: 26.8 PG (ref 26–34)
MCHC RBC AUTO-ENTMCNC: 31.5 G/DL (ref 32–36)
MCV RBC AUTO: 85 FL (ref 80–100)
MONOCYTES # BLD AUTO: 0.36 X10*3/UL (ref 0.1–1)
MONOCYTES NFR BLD AUTO: 5.2 %
NEUTROPHILS # BLD AUTO: 4.44 X10*3/UL (ref 1.2–7.7)
NEUTROPHILS NFR BLD AUTO: 64 %
NRBC BLD-RTO: 0 /100 WBCS (ref 0–0)
PLATELET # BLD AUTO: 229 X10*3/UL (ref 150–450)
POTASSIUM SERPL-SCNC: 3.8 MMOL/L (ref 3.5–5.3)
PROT SERPL-MCNC: 6.9 G/DL (ref 6.4–8.2)
RBC # BLD AUTO: 5.12 X10*6/UL (ref 4–5.2)
SODIUM SERPL-SCNC: 139 MMOL/L (ref 136–145)
WBC # BLD AUTO: 6.9 X10*3/UL (ref 4.4–11.3)

## 2023-11-08 PROCEDURE — 71046 X-RAY EXAM CHEST 2 VIEWS: CPT | Performed by: RADIOLOGY

## 2023-11-08 PROCEDURE — 36415 COLL VENOUS BLD VENIPUNCTURE: CPT | Performed by: PHYSICIAN ASSISTANT

## 2023-11-08 PROCEDURE — 76705 ECHO EXAM OF ABDOMEN: CPT

## 2023-11-08 PROCEDURE — 83690 ASSAY OF LIPASE: CPT | Performed by: PHYSICIAN ASSISTANT

## 2023-11-08 PROCEDURE — 71046 X-RAY EXAM CHEST 2 VIEWS: CPT | Mod: FY

## 2023-11-08 PROCEDURE — 84484 ASSAY OF TROPONIN QUANT: CPT | Performed by: PHYSICIAN ASSISTANT

## 2023-11-08 PROCEDURE — 99285 EMERGENCY DEPT VISIT HI MDM: CPT | Mod: 25

## 2023-11-08 PROCEDURE — 85025 COMPLETE CBC W/AUTO DIFF WBC: CPT | Performed by: PHYSICIAN ASSISTANT

## 2023-11-08 PROCEDURE — 80053 COMPREHEN METABOLIC PANEL: CPT | Performed by: PHYSICIAN ASSISTANT

## 2023-11-08 PROCEDURE — 76705 ECHO EXAM OF ABDOMEN: CPT | Performed by: RADIOLOGY

## 2023-11-08 PROCEDURE — 99285 EMERGENCY DEPT VISIT HI MDM: CPT | Mod: 25 | Performed by: PHYSICIAN ASSISTANT

## 2023-11-08 ASSESSMENT — COLUMBIA-SUICIDE SEVERITY RATING SCALE - C-SSRS
1. IN THE PAST MONTH, HAVE YOU WISHED YOU WERE DEAD OR WISHED YOU COULD GO TO SLEEP AND NOT WAKE UP?: NO
2. HAVE YOU ACTUALLY HAD ANY THOUGHTS OF KILLING YOURSELF?: NO
6. HAVE YOU EVER DONE ANYTHING, STARTED TO DO ANYTHING, OR PREPARED TO DO ANYTHING TO END YOUR LIFE?: NO

## 2023-11-08 ASSESSMENT — PAIN SCALES - GENERAL: PAINLEVEL_OUTOF10: 6

## 2023-11-08 ASSESSMENT — LIFESTYLE VARIABLES
HAVE PEOPLE ANNOYED YOU BY CRITICIZING YOUR DRINKING: NO
EVER HAD A DRINK FIRST THING IN THE MORNING TO STEADY YOUR NERVES TO GET RID OF A HANGOVER: NO
EVER FELT BAD OR GUILTY ABOUT YOUR DRINKING: NO
HAVE YOU EVER FELT YOU SHOULD CUT DOWN ON YOUR DRINKING: NO
REASON UNABLE TO ASSESS: NO

## 2023-11-08 ASSESSMENT — PAIN DESCRIPTION - ORIENTATION: ORIENTATION: RIGHT;UPPER

## 2023-11-08 ASSESSMENT — PAIN DESCRIPTION - PROGRESSION: CLINICAL_PROGRESSION: GRADUALLY WORSENING

## 2023-11-08 ASSESSMENT — PAIN - FUNCTIONAL ASSESSMENT: PAIN_FUNCTIONAL_ASSESSMENT: 0-10

## 2023-11-08 ASSESSMENT — PAIN DESCRIPTION - DESCRIPTORS
DESCRIPTORS: ACHING
DESCRIPTORS: ACHING

## 2023-11-08 ASSESSMENT — PAIN DESCRIPTION - FREQUENCY: FREQUENCY: INTERMITTENT

## 2023-11-08 ASSESSMENT — PAIN DESCRIPTION - LOCATION: LOCATION: ABDOMEN

## 2023-11-08 ASSESSMENT — PAIN DESCRIPTION - PAIN TYPE: TYPE: ACUTE PAIN

## 2023-11-08 ASSESSMENT — PAIN DESCRIPTION - ONSET: ONSET: ONGOING

## 2023-11-08 NOTE — ED TRIAGE NOTES
Patient c/o RUQ abdominal pain radiating into her back that woke her up out of a sleep this AM. Patient states she has nausea, no vomiting.

## 2023-11-08 NOTE — ED PROVIDER NOTES
HPI   Chief Complaint   Patient presents with    Abdominal Pain       This is a 68-year-old female presenting for evaluation of right-sided lower chest/right upper quadrant abdominal pain shooting like an arrow straight through to her back since last night.  Pleuritic.  Denies any prior history of DVT/PE or coagulopathy, recent hospitalizations or immobilization, unilateral extremity swelling, recent prolonged travel, hemoptysis, history of malignancy, exogenous estrogen.  Denies crushing exertional anterior chest pain.  Denies shortness of breath, vomiting, change in stool.                          [place] Coma Scale Score: 15                  Patient History   Past Medical History:   Diagnosis Date    Migraine 02/20/2023    Pain in unspecified foot 06/09/2020    Foot pain    Pain in unspecified foot 06/09/2020    Foot pain    Personal history of other diseases of the digestive system 11/01/2019    History of gastroesophageal reflux (GERD)    Personal history of other endocrine, nutritional and metabolic disease 10/28/2019    History of hyperlipidemia    Personal history of other infectious and parasitic diseases 07/16/2020    History of herpes zoster     Past Surgical History:   Procedure Laterality Date    BI US GUIDED BREAST LOCALIZATION AND BIOPSY LEFT Left 10/19/2023    BI US GUIDED BREAST LOCALIZATION AND BIOPSY LEFT 10/19/2023 [facility] US    BREAST BIOPSY      BREAST BIOPSY Left 01/01/1981    bgn exci    OTHER SURGICAL HISTORY  11/01/2019    Bunionectomy    OTHER SURGICAL HISTORY  11/01/2019    Carpal tunnel surgery     Family History   Problem Relation Name Age of Onset    Diabetes Mother      Other (cardiac disorder) Father      Breast cancer Sister  60     Social History     Tobacco Use    Smoking status: Never     Passive exposure: Past    Smokeless tobacco: Never   Vaping Use    Vaping Use: Never used   Substance Use Topics    Alcohol use: Not Currently    Drug use: Never       Physical Exam   ED Triage  Vitals [11/08/23 1027]   Temp Heart Rate Resp BP   36.9 °C (98.4 °F) 73 18 159/78      SpO2 Temp Source Heart Rate Source Patient Position   96 % Tympanic Monitor --      BP Location FiO2 (%)     -- --       Physical Exam    General: Vitals noted. Afebrile. No apparent distress  EENT: Sclerae anicteric  Cardiac: Regular rate and rhythm. No murmur  Pulmonary: Lungs clear bilaterally with good aeration  Abdomen: Soft. Nontender. No rebound. No guarding  : No CVA tenderness. exam deferred  Extremities: No peripheral edema  Skin: No rash on abdomen  Neuro: No focal neurologic deficits    ED Course & MDM   Diagnoses as of 11/08/23 1440   RUQ pain       Medical Decision Making  DDx: cholecystitis, pancreatitis, GERD, cardiac  EKG Interpreted by me: NSR.  Rate 64.  Normal axis.  QTc 418 ms.  No acute T-wave changes. No STEMI.    68-year-old female presenting for evaluation of right-sided lower chest/right upper quadrant abdominal pain shooting like an arrow straight through to her back since last night. VSS. Visibly nontoxic no distress.  Laboratory evaluation is reassuring.  Chest x-ray shows no acute cardiopulmonary process as read by the radiologist.  Right upper quadrant ultrasound showed no acute gallbladder pathology as read by the radiologist.  Patient did not require any medication for pain control.  She has been asymptomatic throughout her ED stay.  Unclear as to the etiology of symptoms however patient will be given GI referral for further work-up and was advised return to the nearest emergency department if any concerns or new or worsening symptoms.  Patient verbalized understanding and agreement with plan.      Disclaimer: This note was dictated using speech recognition software. An attempt at proofreading was made to minimize errors. Minor errors in transcription may be present. Please call if questions.        Procedure  Procedures     Gentry Patiño PA-C  11/08/23 1441

## 2023-11-21 ENCOUNTER — TELEPHONE (OUTPATIENT)
Dept: PRIMARY CARE | Facility: CLINIC | Age: 68
End: 2023-11-21
Payer: MEDICARE

## 2023-11-22 RX ORDER — ESCITALOPRAM OXALATE 5 MG/1
5 TABLET ORAL DAILY
COMMUNITY
End: 2023-12-05 | Stop reason: SDUPTHER

## 2023-11-22 NOTE — TELEPHONE ENCOUNTER
Pt requesting escitalopram. Shows discontinued but patient states has been taking daily escitalopram 5 mg daily for anxiety. Linked to depression in past scripts. No notes showing to discontinue medication

## 2023-12-05 DIAGNOSIS — F41.9 ANXIETY: Primary | ICD-10-CM

## 2023-12-05 RX ORDER — ESCITALOPRAM OXALATE 5 MG/1
5 TABLET ORAL DAILY
Qty: 90 TABLET | Refills: 1 | Status: SHIPPED | OUTPATIENT
Start: 2023-12-05 | End: 2024-04-26 | Stop reason: SDUPTHER

## 2023-12-26 ENCOUNTER — OFFICE VISIT (OUTPATIENT)
Dept: GASTROENTEROLOGY | Facility: CLINIC | Age: 68
End: 2023-12-26
Payer: MEDICARE

## 2023-12-26 VITALS — WEIGHT: 170 LBS | BODY MASS INDEX: 29.02 KG/M2 | HEIGHT: 64 IN

## 2023-12-26 DIAGNOSIS — R10.84 GENERALIZED ABDOMINAL PAIN: Primary | ICD-10-CM

## 2023-12-26 PROCEDURE — 1126F AMNT PAIN NOTED NONE PRSNT: CPT | Performed by: PHYSICIAN ASSISTANT

## 2023-12-26 PROCEDURE — 3008F BODY MASS INDEX DOCD: CPT | Performed by: PHYSICIAN ASSISTANT

## 2023-12-26 PROCEDURE — 99204 OFFICE O/P NEW MOD 45 MIN: CPT | Performed by: PHYSICIAN ASSISTANT

## 2023-12-26 PROCEDURE — 1159F MED LIST DOCD IN RCRD: CPT | Performed by: PHYSICIAN ASSISTANT

## 2023-12-26 PROCEDURE — 1036F TOBACCO NON-USER: CPT | Performed by: PHYSICIAN ASSISTANT

## 2023-12-26 PROCEDURE — 1160F RVW MEDS BY RX/DR IN RCRD: CPT | Performed by: PHYSICIAN ASSISTANT

## 2023-12-26 RX ORDER — HYDROGEN PEROXIDE 3 %
20 SOLUTION, NON-ORAL MISCELLANEOUS
Qty: 30 CAPSULE | Refills: 11 | Status: SHIPPED | OUTPATIENT
Start: 2023-12-26 | End: 2024-12-25

## 2023-12-26 NOTE — PROGRESS NOTES
Subjective   Patient ID: Rebeca Penn is a 68 y.o. female who presents for Abdominal Pain (Right side).  HPI  68-year-old female presents to GI clinic for initial evaluation abdominal pain.  She reports that it is epigastric and can radiate to the right and left upper quadrants.  She notes difficulty swallowing with solid foods mostly in the evening.  She takes Nexium every other day over-the-counter.  She reports that she sometimes has to take Tums in between.  She denies nausea, vomiting, fever, chills, unintentional weight loss, constipation, BRBPR, melena.  She reports she does not have many solid stools.  She does not feel that she gets enough fiber in her diet.  She had a colonoscopy in 2020 with Dr. Flanagan which was within normal limits and a 10-year follow-up was recommended.  Also had EGD in 2018 which noted LA grade a esophagitis, gastritis.  Biopsies were within normal limits.  Esophagram at that time was also within normal limits.  He presented to the ED which noted mild leukocytosis on labs.  Hemoglobin within normal limits.  Gallbladder ultrasound was normal.  She reports the pain is in the right side sometimes.    Occasional NSAIDs.  Denies smoking, alcohol, illicits.  Denies family history of CRC.  Abdominal pain-Yes      Bloating-No  Abdominal swelling-No     Burping-No     Trouble swallowing-Yes  Painful swallowing-No     Feeling full after small meal-Yes     Heartburn-Yes      Nausea-No      Regurgitation-Yes  Vomiting-No  Vomiting blood-No  Vomiting coffee grounds-No    Constipation-No  Diarrhea-No  Fecal incontinence-No  Fecal urgency-No   BRBPR-No  Black stools-No  Maroon stools-No  Unintentional weight loss-No   Have you had a Colonoscopy-Yes (8/12/20)  Have you had an EGD-Yes 6/20/18    Objective   Physical Exam  @Constitutional: well nourished, well appearing. NAD. Alert and cooperative  Skin: no jaundice   Eyes: anicteric, normal conjunctiva  ENT: MMM  Pulmonary: easy and nonlabored on  RA  Abdomen: soft, NT, ND. No ascites.  MSK: MAEx4  Extremities: no edema  Neuro: aaox3  Psych: appropriate mood and behavior     No visits with results within 1 Month(s) from this visit.   Latest known visit with results is:   Admission on 11/08/2023, Discharged on 11/08/2023   Component Date Value Ref Range Status    WBC 11/08/2023 6.9  4.4 - 11.3 x10*3/uL Final    nRBC 11/08/2023 0.0  0.0 - 0.0 /100 WBCs Final    RBC 11/08/2023 5.12  4.00 - 5.20 x10*6/uL Final    Hemoglobin 11/08/2023 13.7  12.0 - 16.0 g/dL Final    Hematocrit 11/08/2023 43.5  36.0 - 46.0 % Final    MCV 11/08/2023 85  80 - 100 fL Final    MCH 11/08/2023 26.8  26.0 - 34.0 pg Final    MCHC 11/08/2023 31.5 (L)  32.0 - 36.0 g/dL Final    RDW 11/08/2023 13.8  11.5 - 14.5 % Final    Platelets 11/08/2023 229  150 - 450 x10*3/uL Final    Neutrophils % 11/08/2023 64.0  40.0 - 80.0 % Final    Immature Granulocytes %, Automated 11/08/2023 0.3  0.0 - 0.9 % Final    Immature Granulocyte Count (IG) includes promyelocytes, myelocytes and metamyelocytes but does not include bands. Percent differential counts (%) should be interpreted in the context of the absolute cell counts (cells/UL).    Lymphocytes % 11/08/2023 27.0  13.0 - 44.0 % Final    Monocytes % 11/08/2023 5.2  2.0 - 10.0 % Final    Eosinophils % 11/08/2023 2.2  0.0 - 6.0 % Final    Basophils % 11/08/2023 1.3  0.0 - 2.0 % Final    Neutrophils Absolute 11/08/2023 4.44  1.20 - 7.70 x10*3/uL Final    Percent differential counts (%) should be interpreted in the context of the absolute cell counts (cells/uL).    Immature Granulocytes Absolute, Au* 11/08/2023 0.02  0.00 - 0.70 x10*3/uL Final    Lymphocytes Absolute 11/08/2023 1.87  1.20 - 4.80 x10*3/uL Final    Monocytes Absolute 11/08/2023 0.36  0.10 - 1.00 x10*3/uL Final    Eosinophils Absolute 11/08/2023 0.15  0.00 - 0.70 x10*3/uL Final    Basophils Absolute 11/08/2023 0.09  0.00 - 0.10 x10*3/uL Final    Glucose 11/08/2023 93  74 - 99 mg/dL Final     Sodium 11/08/2023 139  136 - 145 mmol/L Final    Potassium 11/08/2023 3.8  3.5 - 5.3 mmol/L Final    Chloride 11/08/2023 104  98 - 107 mmol/L Final    Bicarbonate 11/08/2023 27  21 - 32 mmol/L Final    Anion Gap 11/08/2023 12  10 - 20 mmol/L Final    Urea Nitrogen 11/08/2023 17  6 - 23 mg/dL Final    Creatinine 11/08/2023 0.90  0.50 - 1.05 mg/dL Final    eGFR 11/08/2023 69  >60 mL/min/1.73m*2 Final    Calculations of estimated GFR are performed using the 2021 CKD-EPI Study Refit equation without the race variable for the IDMS-Traceable creatinine methods.  https://jasn.asnjournals.org/content/early/2021/09/22/ASN.3827059279    Calcium 11/08/2023 8.9  8.6 - 10.3 mg/dL Final    Albumin 11/08/2023 4.0  3.4 - 5.0 g/dL Final    Alkaline Phosphatase 11/08/2023 71  33 - 136 U/L Final    Total Protein 11/08/2023 6.9  6.4 - 8.2 g/dL Final    AST 11/08/2023 26  9 - 39 U/L Final    Bilirubin, Total 11/08/2023 0.5  0.0 - 1.2 mg/dL Final    ALT 11/08/2023 15  7 - 45 U/L Final    Patients treated with Sulfasalazine may generate falsely decreased results for ALT.    Lipase 11/08/2023 27  9 - 82 U/L Final    Troponin I, High Sensitivity 11/08/2023 <3  0 - 13 ng/L Final       Assessment/Plan     68-year-old female presents to GI clinic for initial evaluation abdominal pain.  Right upper quadrant abdominal pain, epigastric pain.  Possibly related to GERD versus hiatal hernia versus gallbladder disease.  Also with dysphagia, should rule out underlying esophageal stricture.    -Schedule EGD with possible dilation  -Take Nexium every day.  -Start taking fiber supplement each day  -HIDA scan ordered

## 2024-01-04 ENCOUNTER — HOSPITAL ENCOUNTER (OUTPATIENT)
Dept: RADIOLOGY | Facility: HOSPITAL | Age: 69
Discharge: HOME | End: 2024-01-04
Payer: MEDICARE

## 2024-01-04 DIAGNOSIS — R10.84 GENERALIZED ABDOMINAL PAIN: ICD-10-CM

## 2024-01-04 PROCEDURE — A9537 TC99M MEBROFENIN: HCPCS | Performed by: PHYSICIAN ASSISTANT

## 2024-01-04 PROCEDURE — 2500000004 HC RX 250 GENERAL PHARMACY W/ HCPCS (ALT 636 FOR OP/ED): Performed by: PHYSICIAN ASSISTANT

## 2024-01-04 PROCEDURE — 78227 HEPATOBIL SYST IMAGE W/DRUG: CPT

## 2024-01-04 PROCEDURE — 3430000001 HC RX 343 DIAGNOSTIC RADIOPHARMACEUTICALS: Performed by: PHYSICIAN ASSISTANT

## 2024-01-04 RX ORDER — SINCALIDE 5 UG/5ML
1.5 INJECTION, POWDER, LYOPHILIZED, FOR SOLUTION INTRAVENOUS
Status: CANCELLED | OUTPATIENT
Start: 2024-01-04

## 2024-01-04 RX ORDER — KIT FOR THE PREPARATION OF TECHNETIUM TC 99M MEBROFENIN 45 MG/10ML
6 INJECTION, POWDER, LYOPHILIZED, FOR SOLUTION INTRAVENOUS
Status: COMPLETED | OUTPATIENT
Start: 2024-01-04 | End: 2024-01-04

## 2024-01-04 RX ORDER — SINCALIDE 5 UG/5ML
1.5 INJECTION, POWDER, LYOPHILIZED, FOR SOLUTION INTRAVENOUS
Status: COMPLETED | OUTPATIENT
Start: 2024-01-04 | End: 2024-01-04

## 2024-01-04 RX ADMIN — SINCALIDE 1.5 MCG: 5 INJECTION, POWDER, LYOPHILIZED, FOR SOLUTION INTRAVENOUS at 10:55

## 2024-01-04 RX ADMIN — KIT FOR THE PREPARATION OF TECHNETIUM TC 99M MEBROFENIN 6 MILLICURIE: 45 INJECTION, POWDER, LYOPHILIZED, FOR SOLUTION INTRAVENOUS at 09:55

## 2024-01-04 NOTE — RESULT ENCOUNTER NOTE
Fabrice Jose,    The HIDA scan shows a 97% ejection fraction which while considered normal, can cause abdominal pain after eating and nausea especially in the right side. Depending on how your EGD looks and how you're feeling on the Nexium, we can decide if you need to see a general surgeon about possibly removing your gallbladder if your symptoms are no better. Please call us with questions or concerns [phone]    Thank you,    Jessica Laureano PA-C

## 2024-01-23 DIAGNOSIS — E78.2 MIXED HYPERLIPIDEMIA: ICD-10-CM

## 2024-01-24 RX ORDER — ATORVASTATIN CALCIUM 80 MG/1
80 TABLET, FILM COATED ORAL DAILY
Qty: 90 TABLET | Refills: 0 | Status: SHIPPED | OUTPATIENT
Start: 2024-01-24 | End: 2024-04-26 | Stop reason: SDUPTHER

## 2024-02-12 ENCOUNTER — TELEPHONE (OUTPATIENT)
Dept: PREADMISSION TESTING | Facility: HOSPITAL | Age: 69
End: 2024-02-12
Payer: MEDICARE

## 2024-02-12 NOTE — TELEPHONE ENCOUNTER
SURGERY PRE-OPERATIVE INSTRUCTIONS    *You will receive a phone call the day before your procedure  after 2pm, (or the Friday before your surgery if scheduled on a Monday.) Generally the hospital will be calling you with this information after that time.    *You are not to eat after midnight the night before the surgery. You may have 8oz of a clear liquid up until 2 hours prior to arriving to the hospital. The exception is with medications you were instructed to take day of surgery.    *You may take tylenol for pain/discomfort as needed.     *Stop taking all aspirin products, ibuprofen (motrin/advil), naproxen (aleve/naprosyn) for one week prior to surgery.    *Stop taking all vitamins and supplements one week prior to surgery.     *You should not have alcoholic beverages for 24 hours before surgery.     *You should not smoke 24 hours prior to surgery.     *To help prevent surgical infections bathe/shower with Dial soap the evening before surgery.    *You can wear deodorant but no lotion, powder, or perfume/cologne. You should remove all make-up and nail polish at home.    *If you wear glasses, please bring a case for the glasses with you.    *You will be asked to remove dentures and contacts.     *Please leave all valuables at home.    *You should wear loose, comfortable clothing that will accommodate bandages and/or casts.    *You should notify your doctor of any change in your condition (fever, cold, rash, etc). Surgery may need to be re-scheduled until a time you are in better health.    *A responsible adult is required to accompany you to and from the hospital if you are receiving anesthesia or a sedative. Patients are not permitted to drive for 24 hours after anesthesia.     *You can use the [employer] parking if you wish.     *If you have any further questions please call [phone].

## 2024-02-12 NOTE — TELEPHONE ENCOUNTER
SURGERY PRE-OPERATIVE INSTRUCTIONS    *You will receive a phone call the day before your procedure  after 2pm, (or the Friday before your surgery if scheduled on a Monday.) Generally the hospital will be calling you with this information after that time.    *You are not to eat after midnight the night before the surgery. You may have 8oz of a clear liquid up until 2 hours prior to arriving to the hospital. The exception is with medications you were instructed to take day of surgery.    *You may take tylenol for pain/discomfort as needed.     *Stop taking all aspirin products, ibuprofen (motrin/advil), naproxen (aleve/naprosyn) for one week prior to surgery.    *Stop taking all vitamins and supplements one week prior to surgery.     *You should not have alcoholic beverages for 24 hours before surgery.     *You should not smoke 24 hours prior to surgery.     *To help prevent surgical infections bathe/shower with Dial soap the evening before surgery.    *You can wear deodorant but no lotion, powder, or perfume/cologne. You should remove all make-up and nail polish at home.    *If you wear glasses, please bring a case for the glasses with you.    *You will be asked to remove dentures and contacts.     *Please leave all valuables at home.    *You should wear loose, comfortable clothing that will accommodate bandages and/or casts.    *You should notify your doctor of any change in your condition (fever, cold, rash, etc). Surgery may need to be re-scheduled until a time you are in better health.    *A responsible adult is required to accompany you to and from the hospital if you are receiving anesthesia or a sedative. Patients are not permitted to drive for 24 hours after anesthesia.     *You can use the [email or web address] parking if you wish.     *If you have any further questions please call [phone].

## 2024-02-13 ENCOUNTER — ANESTHESIA EVENT (OUTPATIENT)
Dept: OPERATING ROOM | Facility: HOSPITAL | Age: 69
End: 2024-02-13
Payer: MEDICARE

## 2024-02-14 ENCOUNTER — HOSPITAL ENCOUNTER (OUTPATIENT)
Dept: OPERATING ROOM | Facility: HOSPITAL | Age: 69
Setting detail: OUTPATIENT SURGERY
Discharge: HOME | End: 2024-02-14
Payer: MEDICARE

## 2024-02-14 ENCOUNTER — ANESTHESIA (OUTPATIENT)
Dept: OPERATING ROOM | Facility: HOSPITAL | Age: 69
End: 2024-02-14
Payer: MEDICARE

## 2024-02-14 VITALS
TEMPERATURE: 97.3 F | DIASTOLIC BLOOD PRESSURE: 64 MMHG | SYSTOLIC BLOOD PRESSURE: 136 MMHG | HEART RATE: 55 BPM | RESPIRATION RATE: 16 BRPM | HEIGHT: 64 IN | BODY MASS INDEX: 29.32 KG/M2 | OXYGEN SATURATION: 100 % | WEIGHT: 171.74 LBS

## 2024-02-14 DIAGNOSIS — R10.84 GENERALIZED ABDOMINAL PAIN: ICD-10-CM

## 2024-02-14 PROCEDURE — 3600000002 HC OR TIME - INITIAL BASE CHARGE - PROCEDURE LEVEL TWO: Performed by: NURSE ANESTHETIST, CERTIFIED REGISTERED

## 2024-02-14 PROCEDURE — 3600000007 HC OR TIME - EACH INCREMENTAL 1 MINUTE - PROCEDURE LEVEL TWO: Performed by: NURSE ANESTHETIST, CERTIFIED REGISTERED

## 2024-02-14 PROCEDURE — 2500000004 HC RX 250 GENERAL PHARMACY W/ HCPCS (ALT 636 FOR OP/ED): Performed by: STUDENT IN AN ORGANIZED HEALTH CARE EDUCATION/TRAINING PROGRAM

## 2024-02-14 PROCEDURE — A43239 PR EDG TRANSORAL BIOPSY SINGLE/MULTIPLE: Performed by: NURSE ANESTHETIST, CERTIFIED REGISTERED

## 2024-02-14 PROCEDURE — 0753T DGTZ GLS MCRSCP SLD LEVEL IV: CPT | Mod: TC,SUR,GEALAB | Performed by: INTERNAL MEDICINE

## 2024-02-14 PROCEDURE — 3700000001 HC GENERAL ANESTHESIA TIME - INITIAL BASE CHARGE: Performed by: NURSE ANESTHETIST, CERTIFIED REGISTERED

## 2024-02-14 PROCEDURE — 3700000002 HC GENERAL ANESTHESIA TIME - EACH INCREMENTAL 1 MINUTE: Performed by: NURSE ANESTHETIST, CERTIFIED REGISTERED

## 2024-02-14 PROCEDURE — 43239 EGD BIOPSY SINGLE/MULTIPLE: CPT | Performed by: INTERNAL MEDICINE

## 2024-02-14 PROCEDURE — 2500000004 HC RX 250 GENERAL PHARMACY W/ HCPCS (ALT 636 FOR OP/ED): Performed by: NURSE ANESTHETIST, CERTIFIED REGISTERED

## 2024-02-14 PROCEDURE — 88305 TISSUE EXAM BY PATHOLOGIST: CPT | Performed by: PATHOLOGY

## 2024-02-14 PROCEDURE — 7100000010 HC PHASE TWO TIME - EACH INCREMENTAL 1 MINUTE: Performed by: NURSE ANESTHETIST, CERTIFIED REGISTERED

## 2024-02-14 PROCEDURE — 7100000009 HC PHASE TWO TIME - INITIAL BASE CHARGE: Performed by: NURSE ANESTHETIST, CERTIFIED REGISTERED

## 2024-02-14 RX ORDER — FENTANYL CITRATE 50 UG/ML
INJECTION, SOLUTION INTRAMUSCULAR; INTRAVENOUS AS NEEDED
Status: DISCONTINUED | OUTPATIENT
Start: 2024-02-14 | End: 2024-02-14

## 2024-02-14 RX ORDER — MIDAZOLAM HYDROCHLORIDE 1 MG/ML
INJECTION INTRAMUSCULAR; INTRAVENOUS AS NEEDED
Status: DISCONTINUED | OUTPATIENT
Start: 2024-02-14 | End: 2024-02-14

## 2024-02-14 RX ORDER — SODIUM CHLORIDE, SODIUM LACTATE, POTASSIUM CHLORIDE, CALCIUM CHLORIDE 600; 310; 30; 20 MG/100ML; MG/100ML; MG/100ML; MG/100ML
100 INJECTION, SOLUTION INTRAVENOUS CONTINUOUS
Status: DISCONTINUED | OUTPATIENT
Start: 2024-02-14 | End: 2024-02-15 | Stop reason: HOSPADM

## 2024-02-14 RX ORDER — OXYCODONE HYDROCHLORIDE 5 MG/1
10 TABLET ORAL EVERY 4 HOURS PRN
Status: DISCONTINUED | OUTPATIENT
Start: 2024-02-14 | End: 2024-02-15 | Stop reason: HOSPADM

## 2024-02-14 RX ORDER — LIDOCAINE HYDROCHLORIDE 10 MG/ML
0.1 INJECTION, SOLUTION EPIDURAL; INFILTRATION; INTRACAUDAL; PERINEURAL ONCE
Status: DISCONTINUED | OUTPATIENT
Start: 2024-02-14 | End: 2024-02-15 | Stop reason: HOSPADM

## 2024-02-14 RX ORDER — OXYCODONE HYDROCHLORIDE 5 MG/1
5 TABLET ORAL EVERY 4 HOURS PRN
Status: DISCONTINUED | OUTPATIENT
Start: 2024-02-14 | End: 2024-02-15 | Stop reason: HOSPADM

## 2024-02-14 RX ORDER — PROPOFOL 10 MG/ML
INJECTION, EMULSION INTRAVENOUS AS NEEDED
Status: DISCONTINUED | OUTPATIENT
Start: 2024-02-14 | End: 2024-02-14

## 2024-02-14 RX ORDER — ACETAMINOPHEN 325 MG/1
650 TABLET ORAL EVERY 4 HOURS PRN
Status: DISCONTINUED | OUTPATIENT
Start: 2024-02-14 | End: 2024-02-15 | Stop reason: HOSPADM

## 2024-02-14 RX ADMIN — SODIUM CHLORIDE, POTASSIUM CHLORIDE, SODIUM LACTATE AND CALCIUM CHLORIDE 100 ML/HR: 600; 310; 30; 20 INJECTION, SOLUTION INTRAVENOUS at 09:08

## 2024-02-14 RX ADMIN — FENTANYL CITRATE 50 MCG: 50 INJECTION, SOLUTION INTRAMUSCULAR; INTRAVENOUS at 09:21

## 2024-02-14 RX ADMIN — PROPOFOL 50 MG: 10 INJECTION, EMULSION INTRAVENOUS at 09:30

## 2024-02-14 RX ADMIN — MIDAZOLAM HYDROCHLORIDE 2 MG: 1 INJECTION, SOLUTION INTRAMUSCULAR; INTRAVENOUS at 09:21

## 2024-02-14 RX ADMIN — PROPOFOL 50 MG: 10 INJECTION, EMULSION INTRAVENOUS at 09:21

## 2024-02-14 SDOH — HEALTH STABILITY: MENTAL HEALTH: CURRENT SMOKER: 0

## 2024-02-14 ASSESSMENT — ENCOUNTER SYMPTOMS
TROUBLE SWALLOWING: 1
ROS GI COMMENTS: REFLUX

## 2024-02-14 ASSESSMENT — PAIN - FUNCTIONAL ASSESSMENT: PAIN_FUNCTIONAL_ASSESSMENT: 0-10

## 2024-02-14 ASSESSMENT — PAIN SCALES - GENERAL
PAINLEVEL_OUTOF10: 0 - NO PAIN
PAIN_LEVEL: 0

## 2024-02-14 NOTE — ANESTHESIA POSTPROCEDURE EVALUATION
Patient: Rebeca Penn    Procedure Summary       Date: 02/14/24 Room / Location: [facility] OR    Anesthesia Start: 0920 Anesthesia Stop: 0938    Procedure: EGD Diagnosis: Generalized abdominal pain    Scheduled Providers: Faith Del Toro MD Responsible Provider: VIANNEY Zambrano    Anesthesia Type: MAC ASA Status: 2            Anesthesia Type: MAC    Vitals Value Taken Time   /64 02/14/24 1003   Temp 36.3 °C (97.3 °F) 02/14/24 0933   Pulse 55 02/14/24 1003   Resp 16 02/14/24 1003   SpO2 100 % 02/14/24 1003       Anesthesia Post Evaluation    Patient location during evaluation: bedside  Patient participation: complete - patient participated  Level of consciousness: awake  Pain score: 0  Pain management: adequate  Airway patency: patent  Cardiovascular status: acceptable  Respiratory status: acceptable  Hydration status: acceptable  Postoperative Nausea and Vomiting: none        No notable events documented.

## 2024-02-14 NOTE — ANESTHESIA PREPROCEDURE EVALUATION
Patient: Rebeca Penn    Procedure Information       Date/Time: 02/14/24 0940    Scheduled providers: Faith Del Toro MD    Procedure: EGD    Location: [facility] OR            Relevant Problems   Cardiovascular   (+) Hyperlipidemia   (+) Hypertension      GI   (+) GERD (gastroesophageal reflux disease)   (+) Hiatal hernia      Neuro/Psych   (+) Acute anxiety       Clinical information reviewed:    Allergies                NPO Detail:  No data recorded     Physical Exam    Airway  Mallampati: II  TM distance: >3 FB     Cardiovascular - normal exam     Dental - normal exam     Pulmonary - normal exam     Abdominal - normal exam             Anesthesia Plan    History of general anesthesia?: yes  History of complications of general anesthesia?: no    ASA 2     MAC     The patient is not a current smoker.  Patient was previously instructed to abstain from smoking on day of procedure.  Patient did not smoke on day of procedure.    intravenous induction   Anesthetic plan and risks discussed with patient.

## 2024-02-14 NOTE — H&P
History Of Present Illness  Rebeca Penn is a 68 y.o. female presenting to GI lab for EGD for further evaluation.  History of GERD  Dysphagia  Previous EGD in 2018.     Past Medical History  Past Medical History:   Diagnosis Date    Anxiety     Rx Lexapro    Colon polyp     Generalized abdominal pain     GERD (gastroesophageal reflux disease)     Hiatal hernia     HLD (hyperlipidemia)     HTN (hypertension)     Left breast mass     Migraine 02/20/2023    Pain in unspecified foot 06/09/2020    Foot pain    Pain in unspecified foot 06/09/2020    Foot pain    Personal history of other diseases of the digestive system 11/01/2019    History of gastroesophageal reflux (GERD)    Personal history of other endocrine, nutritional and metabolic disease 10/28/2019    History of hyperlipidemia    Personal history of other infectious and parasitic diseases 07/16/2020    History of herpes zoster       Surgical History  Past Surgical History:   Procedure Laterality Date    ANKLE Right     3 yrs ago    BREAST BIOPSY Left 01/01/1981    bgn exci    COLONOSCOPY  2020    ESOPHAGOGASTRODUODENOSCOPY  2018    MENISCECTOMY Right     OTHER SURGICAL HISTORY      Bunionectomy, 35 yrs ago    OTHER SURGICAL HISTORY Right     Carpal tunnel surgery        Social History  She reports that she has never smoked. She has been exposed to tobacco smoke. She has never used smokeless tobacco. She reports that she does not currently use alcohol. She reports that she does not use drugs.    Family History  Family History   Problem Relation Name Age of Onset    Diabetes Mother      Other (cardiac disorder) Father      Breast cancer Sister  60        Allergies  Amlodipine, Lisinopril, Naproxen sodium, and Prochlorperazine    Review of Systems   HENT:  Positive for trouble swallowing.    Gastrointestinal:         Reflux        Physical Exam  Cardiovascular:      Rate and Rhythm: Normal rate and regular rhythm.   Pulmonary:      Effort: Pulmonary effort is  "normal.      Breath sounds: Normal breath sounds.   Neurological:      Mental Status: She is alert and oriented to person, place, and time.          Last Recorded Vitals  Blood pressure 153/80, pulse 68, temperature 36.5 °C (97.7 °F), resp. rate 16, height 1.626 m (5' 4\"), weight 77.9 kg (171 lb 11.8 oz), SpO2 100 %.    Relevant Results             Assessment/Plan   Active Problems:  There are no active Hospital Problems.       GERD  Dysphagia    EGD             Faith Del Toro MD    "

## 2024-02-14 NOTE — DISCHARGE INSTRUCTIONS

## 2024-02-22 NOTE — RESULT ENCOUNTER NOTE
Discussed EGD results with patient. Small HH and gastritis on EGD. She is feeling better on Nexium. Will occasionally get breakthrough GERD symptoms, but they resolve with taking a Tums. She is not having difficulty swallowing at this point nor is she having abdominal pain as was experiencing back in December. She will let us know if anything changes.

## 2024-04-26 ENCOUNTER — OFFICE VISIT (OUTPATIENT)
Dept: PRIMARY CARE | Facility: CLINIC | Age: 69
End: 2024-04-26
Payer: MEDICARE

## 2024-04-26 VITALS
SYSTOLIC BLOOD PRESSURE: 130 MMHG | OXYGEN SATURATION: 99 % | TEMPERATURE: 98.4 F | BODY MASS INDEX: 29.37 KG/M2 | DIASTOLIC BLOOD PRESSURE: 84 MMHG | HEART RATE: 72 BPM | WEIGHT: 172 LBS | HEIGHT: 64 IN

## 2024-04-26 DIAGNOSIS — I10 PRIMARY HYPERTENSION: Primary | ICD-10-CM

## 2024-04-26 DIAGNOSIS — R73.09 ABNORMAL GLUCOSE: ICD-10-CM

## 2024-04-26 DIAGNOSIS — E78.2 MIXED HYPERLIPIDEMIA: ICD-10-CM

## 2024-04-26 DIAGNOSIS — F41.9 ANXIETY: ICD-10-CM

## 2024-04-26 DIAGNOSIS — Z13.29 THYROID DISORDER SCREENING: ICD-10-CM

## 2024-04-26 DIAGNOSIS — E55.9 VITAMIN D DEFICIENCY: ICD-10-CM

## 2024-04-26 PROCEDURE — 99214 OFFICE O/P EST MOD 30 MIN: CPT | Performed by: INTERNAL MEDICINE

## 2024-04-26 PROCEDURE — 3075F SYST BP GE 130 - 139MM HG: CPT | Performed by: INTERNAL MEDICINE

## 2024-04-26 PROCEDURE — 1159F MED LIST DOCD IN RCRD: CPT | Performed by: INTERNAL MEDICINE

## 2024-04-26 PROCEDURE — 3079F DIAST BP 80-89 MM HG: CPT | Performed by: INTERNAL MEDICINE

## 2024-04-26 PROCEDURE — 1160F RVW MEDS BY RX/DR IN RCRD: CPT | Performed by: INTERNAL MEDICINE

## 2024-04-26 PROCEDURE — 3008F BODY MASS INDEX DOCD: CPT | Performed by: INTERNAL MEDICINE

## 2024-04-26 RX ORDER — ATORVASTATIN CALCIUM 80 MG/1
80 TABLET, FILM COATED ORAL DAILY
Qty: 90 TABLET | Refills: 0 | Status: SHIPPED | OUTPATIENT
Start: 2024-04-26

## 2024-04-26 RX ORDER — AMLODIPINE BESYLATE 5 MG/1
5 TABLET ORAL DAILY
Qty: 90 TABLET | Refills: 1 | Status: SHIPPED | OUTPATIENT
Start: 2024-04-26 | End: 2024-10-23

## 2024-04-26 RX ORDER — ESCITALOPRAM OXALATE 5 MG/1
5 TABLET ORAL DAILY
Qty: 90 TABLET | Refills: 1 | Status: SHIPPED | OUTPATIENT
Start: 2024-04-26 | End: 2024-10-23

## 2024-04-26 ASSESSMENT — ENCOUNTER SYMPTOMS
FATIGUE: 0
TREMORS: 0
PHOTOPHOBIA: 0
SLEEP DISTURBANCE: 0
WEAKNESS: 0
SORE THROAT: 0
SPEECH DIFFICULTY: 0
VOMITING: 0
MYALGIAS: 0
HEADACHES: 0
STRIDOR: 0
SEIZURES: 0
EYE PAIN: 0
FLANK PAIN: 0
NECK PAIN: 0
DIARRHEA: 0
DYSURIA: 0
PALPITATIONS: 0
BLOOD IN STOOL: 0
POLYPHAGIA: 0
CONSTIPATION: 0
WOUND: 0
WHEEZING: 0
FACIAL ASYMMETRY: 0
SINUS PAIN: 0
CHEST TIGHTNESS: 0
FEVER: 0
TROUBLE SWALLOWING: 0
HALLUCINATIONS: 0
DIZZINESS: 0
JOINT SWELLING: 0
HEMATURIA: 0
CONFUSION: 0
ADENOPATHY: 0
UNEXPECTED WEIGHT CHANGE: 0
BACK PAIN: 0
SHORTNESS OF BREATH: 0
NERVOUS/ANXIOUS: 0
POLYDIPSIA: 0
APPETITE CHANGE: 0
NAUSEA: 0
NUMBNESS: 0
ABDOMINAL PAIN: 0
ARTHRALGIAS: 1
VOICE CHANGE: 0
COLOR CHANGE: 0
COUGH: 0
ACTIVITY CHANGE: 0

## 2024-04-26 NOTE — PROGRESS NOTES
Subjective   Patient ID: Rebeca Penn is a 68 y.o. female who presents for Med Refill.    HPI   Patient presented to the office for medication refill and follow up. Last office visit was more than 6 months ago.    Primary Hypertension- BP today is 130/84. Patient is on Amlodipine and need refill.    Mixed hyperlipidemia- Last Lipid panel was done on 9/13/2023 and patient is on Atorvastatin 80 mg oral tablet daily.    Anxiety symptoms are controlled with Escitalopram.    GERD symptoms are controlled by Nexium.     Review of Systems   Constitutional:  Negative for activity change, appetite change, fatigue, fever and unexpected weight change.   HENT:  Negative for dental problem, ear discharge, hearing loss, nosebleeds, postnasal drip, sinus pain, sore throat, trouble swallowing and voice change.    Eyes:  Negative for photophobia, pain and visual disturbance.   Respiratory:  Negative for cough, chest tightness, shortness of breath, wheezing and stridor.    Cardiovascular:  Negative for chest pain, palpitations and leg swelling.   Gastrointestinal:  Negative for abdominal pain, blood in stool, constipation, diarrhea, nausea and vomiting.   Endocrine: Negative for polydipsia, polyphagia and polyuria.   Genitourinary:  Negative for decreased urine volume, dyspareunia, dysuria, flank pain, hematuria and urgency.   Musculoskeletal:  Positive for arthralgias. Negative for back pain, gait problem, joint swelling, myalgias and neck pain.   Skin:  Negative for color change, rash and wound.   Allergic/Immunologic: Negative for environmental allergies and food allergies.   Neurological:  Negative for dizziness, tremors, seizures, syncope, facial asymmetry, speech difficulty, weakness, numbness and headaches.   Hematological:  Negative for adenopathy.   Psychiatric/Behavioral:  Negative for behavioral problems, confusion, hallucinations, self-injury, sleep disturbance and suicidal ideas. The patient is not nervous/anxious.   "    Objective   /84   Pulse 72   Temp 36.9 °C (98.4 °F)   Ht 1.626 m (5' 4\")   Wt 78 kg (172 lb)   SpO2 99%   BMI 29.52 kg/m²     Physical Exam  Vitals and nursing note reviewed.   Constitutional:       General: She is not in acute distress.     Appearance: She is not ill-appearing or toxic-appearing.   HENT:      Head: Normocephalic.      Nose: Nose normal. No congestion.   Eyes:      General:         Right eye: No discharge.         Left eye: No discharge.      Conjunctiva/sclera: Conjunctivae normal.   Cardiovascular:      Rate and Rhythm: Normal rate and regular rhythm.      Pulses: Normal pulses.      Heart sounds: Normal heart sounds. No murmur heard.  Pulmonary:      Effort: Pulmonary effort is normal. No respiratory distress.      Breath sounds: Normal breath sounds. No wheezing, rhonchi or rales.   Musculoskeletal:         General: Normal range of motion.      Cervical back: Normal range of motion.   Neurological:      General: No focal deficit present.      Mental Status: She is alert and oriented to person, place, and time.   Psychiatric:         Mood and Affect: Mood normal.         Behavior: Behavior normal.       Assessment/Plan   Problem List Items Addressed This Visit          Cardiac and Vasculature    Hyperlipidemia    Relevant Medications    atorvastatin (Lipitor) 80 mg tablet    Other Relevant Orders    Lipid Panel (Completed)    Hypertension - Primary    Relevant Medications    amLODIPine (Norvasc) 5 mg tablet    Other Relevant Orders    CBC and Auto Differential (Completed)    Comprehensive Metabolic Panel (Completed)       Endocrine/Metabolic    Vitamin D deficiency    Relevant Orders    Vitamin D 25-Hydroxy,Total (for eval of Vitamin D levels) (Completed)     Other Visit Diagnoses       Anxiety        Relevant Medications    escitalopram (Lexapro) 5 mg tablet    Abnormal glucose        Relevant Orders    Hemoglobin A1C (Completed)    Thyroid disorder screening        Relevant " Orders    TSH with reflex to Free T4 if abnormal (Completed)

## 2024-04-29 ENCOUNTER — LAB (OUTPATIENT)
Dept: LAB | Facility: LAB | Age: 69
End: 2024-04-29
Payer: MEDICARE

## 2024-04-29 DIAGNOSIS — I10 PRIMARY HYPERTENSION: ICD-10-CM

## 2024-04-29 DIAGNOSIS — R73.09 ABNORMAL GLUCOSE: ICD-10-CM

## 2024-04-29 DIAGNOSIS — E55.9 VITAMIN D DEFICIENCY: ICD-10-CM

## 2024-04-29 DIAGNOSIS — E78.2 MIXED HYPERLIPIDEMIA: ICD-10-CM

## 2024-04-29 DIAGNOSIS — Z13.29 THYROID DISORDER SCREENING: ICD-10-CM

## 2024-04-29 LAB
25(OH)D3 SERPL-MCNC: 42 NG/ML (ref 30–100)
ALBUMIN SERPL BCP-MCNC: 4.4 G/DL (ref 3.4–5)
ALP SERPL-CCNC: 78 U/L (ref 33–136)
ALT SERPL W P-5'-P-CCNC: 20 U/L (ref 7–45)
ANION GAP SERPL CALC-SCNC: 12 MMOL/L (ref 10–20)
AST SERPL W P-5'-P-CCNC: 33 U/L (ref 9–39)
BASOPHILS # BLD AUTO: 0.1 X10*3/UL (ref 0–0.1)
BASOPHILS NFR BLD AUTO: 1.7 %
BILIRUB SERPL-MCNC: 0.4 MG/DL (ref 0–1.2)
BUN SERPL-MCNC: 19 MG/DL (ref 6–23)
CALCIUM SERPL-MCNC: 9.4 MG/DL (ref 8.6–10.3)
CHLORIDE SERPL-SCNC: 104 MMOL/L (ref 98–107)
CHOLEST SERPL-MCNC: 201 MG/DL (ref 0–199)
CHOLESTEROL/HDL RATIO: 3.9
CO2 SERPL-SCNC: 30 MMOL/L (ref 21–32)
CREAT SERPL-MCNC: 0.83 MG/DL (ref 0.5–1.05)
EGFRCR SERPLBLD CKD-EPI 2021: 76 ML/MIN/1.73M*2
EOSINOPHIL # BLD AUTO: 0.23 X10*3/UL (ref 0–0.7)
EOSINOPHIL NFR BLD AUTO: 3.9 %
ERYTHROCYTE [DISTWIDTH] IN BLOOD BY AUTOMATED COUNT: 14.1 % (ref 11.5–14.5)
EST. AVERAGE GLUCOSE BLD GHB EST-MCNC: 117 MG/DL
GLUCOSE SERPL-MCNC: 94 MG/DL (ref 74–99)
HBA1C MFR BLD: 5.7 %
HCT VFR BLD AUTO: 41.9 % (ref 36–46)
HDLC SERPL-MCNC: 51.2 MG/DL
HGB BLD-MCNC: 13.4 G/DL (ref 12–16)
IMM GRANULOCYTES # BLD AUTO: 0.02 X10*3/UL (ref 0–0.7)
IMM GRANULOCYTES NFR BLD AUTO: 0.3 % (ref 0–0.9)
LDLC SERPL CALC-MCNC: 119 MG/DL
LYMPHOCYTES # BLD AUTO: 1.97 X10*3/UL (ref 1.2–4.8)
LYMPHOCYTES NFR BLD AUTO: 33 %
MCH RBC QN AUTO: 27 PG (ref 26–34)
MCHC RBC AUTO-ENTMCNC: 32 G/DL (ref 32–36)
MCV RBC AUTO: 85 FL (ref 80–100)
MONOCYTES # BLD AUTO: 0.37 X10*3/UL (ref 0.1–1)
MONOCYTES NFR BLD AUTO: 6.2 %
NEUTROPHILS # BLD AUTO: 3.28 X10*3/UL (ref 1.2–7.7)
NEUTROPHILS NFR BLD AUTO: 54.9 %
NON HDL CHOLESTEROL: 150 MG/DL (ref 0–149)
NRBC BLD-RTO: 0 /100 WBCS (ref 0–0)
PLATELET # BLD AUTO: 259 X10*3/UL (ref 150–450)
POTASSIUM SERPL-SCNC: 4.5 MMOL/L (ref 3.5–5.3)
PROT SERPL-MCNC: 6.6 G/DL (ref 6.4–8.2)
RBC # BLD AUTO: 4.96 X10*6/UL (ref 4–5.2)
SODIUM SERPL-SCNC: 141 MMOL/L (ref 136–145)
TRIGL SERPL-MCNC: 156 MG/DL (ref 0–149)
TSH SERPL-ACNC: 2.14 MIU/L (ref 0.44–3.98)
VLDL: 31 MG/DL (ref 0–40)
WBC # BLD AUTO: 6 X10*3/UL (ref 4.4–11.3)

## 2024-04-29 PROCEDURE — 80053 COMPREHEN METABOLIC PANEL: CPT

## 2024-04-29 PROCEDURE — 84443 ASSAY THYROID STIM HORMONE: CPT

## 2024-04-29 PROCEDURE — 80061 LIPID PANEL: CPT

## 2024-04-29 PROCEDURE — 36415 COLL VENOUS BLD VENIPUNCTURE: CPT

## 2024-04-29 PROCEDURE — 85025 COMPLETE CBC W/AUTO DIFF WBC: CPT

## 2024-04-29 PROCEDURE — 83036 HEMOGLOBIN GLYCOSYLATED A1C: CPT

## 2024-04-29 PROCEDURE — 82306 VITAMIN D 25 HYDROXY: CPT

## 2024-08-14 DIAGNOSIS — E78.2 MIXED HYPERLIPIDEMIA: ICD-10-CM

## 2024-08-14 RX ORDER — ATORVASTATIN CALCIUM 80 MG/1
80 TABLET, FILM COATED ORAL DAILY
Qty: 90 TABLET | Refills: 2 | Status: SHIPPED | OUTPATIENT
Start: 2024-08-14

## 2024-08-21 ENCOUNTER — APPOINTMENT (OUTPATIENT)
Dept: PRIMARY CARE | Facility: CLINIC | Age: 69
End: 2024-08-21
Payer: MEDICARE

## 2024-08-21 VITALS
SYSTOLIC BLOOD PRESSURE: 124 MMHG | TEMPERATURE: 97.8 F | BODY MASS INDEX: 29.97 KG/M2 | WEIGHT: 174.6 LBS | HEART RATE: 67 BPM | RESPIRATION RATE: 16 BRPM | OXYGEN SATURATION: 97 % | DIASTOLIC BLOOD PRESSURE: 76 MMHG

## 2024-08-21 DIAGNOSIS — Z51.81 ENCOUNTER FOR THERAPEUTIC DRUG LEVEL MONITORING: Primary | ICD-10-CM

## 2024-08-21 DIAGNOSIS — E66.3 OVERWEIGHT WITH BODY MASS INDEX (BMI) 25.0-29.9: ICD-10-CM

## 2024-08-21 PROBLEM — F41.9 ANXIETY: Status: ACTIVE | Noted: 2023-02-20

## 2024-08-21 PROBLEM — J35.1 ENLARGED TONSILS: Status: ACTIVE | Noted: 2024-08-21

## 2024-08-21 PROBLEM — H02.829 CYST OF EYELID: Status: ACTIVE | Noted: 2023-02-20

## 2024-08-21 PROBLEM — J40 BRONCHITIS: Status: ACTIVE | Noted: 2024-08-21

## 2024-08-21 PROBLEM — S92.919A FRACTURE OF PHALANX OF TOE: Status: ACTIVE | Noted: 2024-08-21

## 2024-08-21 PROBLEM — R10.13 EPIGASTRIC PAIN: Status: ACTIVE | Noted: 2024-08-21

## 2024-08-21 PROBLEM — K21.9 GASTROESOPHAGEAL REFLUX DISEASE: Status: ACTIVE | Noted: 2023-02-20

## 2024-08-21 PROBLEM — N63.20 MASS OF LEFT BREAST: Status: ACTIVE | Noted: 2024-08-21

## 2024-08-21 PROBLEM — B02.9 HERPES ZOSTER: Status: ACTIVE | Noted: 2024-08-21

## 2024-08-21 PROBLEM — H57.9: Status: ACTIVE | Noted: 2023-06-21

## 2024-08-21 PROBLEM — J03.90 ACUTE TONSILLITIS: Status: RESOLVED | Noted: 2024-08-21 | Resolved: 2024-08-21

## 2024-08-21 PROBLEM — H57.89 INFLAMMATORY DISORDER OF EYE: Status: ACTIVE | Noted: 2024-08-21

## 2024-08-21 PROBLEM — M25.519 SHOULDER PAIN: Status: ACTIVE | Noted: 2023-06-21

## 2024-08-21 PROCEDURE — 3074F SYST BP LT 130 MM HG: CPT | Performed by: INTERNAL MEDICINE

## 2024-08-21 PROCEDURE — 99213 OFFICE O/P EST LOW 20 MIN: CPT | Performed by: INTERNAL MEDICINE

## 2024-08-21 PROCEDURE — 3078F DIAST BP <80 MM HG: CPT | Performed by: INTERNAL MEDICINE

## 2024-08-21 PROCEDURE — 1036F TOBACCO NON-USER: CPT | Performed by: INTERNAL MEDICINE

## 2024-08-21 PROCEDURE — 80307 DRUG TEST PRSMV CHEM ANLYZR: CPT

## 2024-08-21 PROCEDURE — 1159F MED LIST DOCD IN RCRD: CPT | Performed by: INTERNAL MEDICINE

## 2024-08-21 PROCEDURE — 1126F AMNT PAIN NOTED NONE PRSNT: CPT | Performed by: INTERNAL MEDICINE

## 2024-08-21 PROCEDURE — 1160F RVW MEDS BY RX/DR IN RCRD: CPT | Performed by: INTERNAL MEDICINE

## 2024-08-21 ASSESSMENT — PATIENT HEALTH QUESTIONNAIRE - PHQ9
2. FEELING DOWN, DEPRESSED OR HOPELESS: NOT AT ALL
SUM OF ALL RESPONSES TO PHQ9 QUESTIONS 1 AND 2: 0
1. LITTLE INTEREST OR PLEASURE IN DOING THINGS: NOT AT ALL

## 2024-08-21 ASSESSMENT — PAIN SCALES - GENERAL: PAINLEVEL: 0-NO PAIN

## 2024-08-21 NOTE — PROGRESS NOTES
Subjective   Patient ID: Rebeca Penn is a 68 y.o. female who presents for phentermine .    HPI   Patient presented to the office to discuss weight loss medicine, Phentermine and also to get Urine drug test and CSA sign.  She denies for any problem today.    Review of Systems   Constitutional:  Positive for unexpected weight change. Negative for activity change, appetite change, fatigue and fever.   HENT:  Negative for dental problem, ear discharge, hearing loss, nosebleeds, postnasal drip, sinus pain, sore throat, trouble swallowing and voice change.    Eyes:  Negative for photophobia, pain and visual disturbance.   Respiratory:  Negative for cough, chest tightness, shortness of breath, wheezing and stridor.    Cardiovascular:  Negative for chest pain, palpitations and leg swelling.   Gastrointestinal:  Negative for abdominal pain, blood in stool, constipation, diarrhea, nausea and vomiting.   Endocrine: Negative for polydipsia, polyphagia and polyuria.   Genitourinary:  Negative for decreased urine volume, dyspareunia, dysuria, flank pain, frequency, hematuria and urgency.   Musculoskeletal:  Negative for arthralgias, back pain, gait problem, joint swelling, myalgias and neck pain.   Skin:  Negative for color change, rash and wound.   Allergic/Immunologic: Negative for environmental allergies and food allergies.   Neurological:  Negative for dizziness, tremors, seizures, syncope, facial asymmetry, speech difficulty, weakness, numbness and headaches.   Hematological:  Negative for adenopathy.   Psychiatric/Behavioral:  Negative for behavioral problems, confusion, hallucinations, self-injury, sleep disturbance and suicidal ideas. The patient is not nervous/anxious.      Objective   /76   Pulse 67   Temp 36.6 °C (97.8 °F) (Temporal)   Resp 16   Wt 79.2 kg (174 lb 9.6 oz)   SpO2 97%   BMI 29.97 kg/m²     Physical Exam  Vitals and nursing note reviewed.   Constitutional:       General: She is not in acute  distress.     Appearance: Normal appearance. She is not ill-appearing or toxic-appearing.   HENT:      Nose: Nose normal.   Eyes:      Conjunctiva/sclera: Conjunctivae normal.   Pulmonary:      Effort: Pulmonary effort is normal.   Musculoskeletal:         General: Normal range of motion.      Cervical back: Normal range of motion.   Neurological:      General: No focal deficit present.      Mental Status: She is alert and oriented to person, place, and time.   Psychiatric:         Mood and Affect: Mood normal.         Behavior: Behavior normal.       Assessment/Plan   Problem List Items Addressed This Visit       Overweight with body mass index (BMI) 25.0-29.9     CSA signed for Phentermine.  Phentermine has been started for weight loss.          Other Visit Diagnoses       Encounter for therapeutic drug level monitoring    -  Primary    Relevant Orders    Drug Screen, Urine With Reflex to Confirmation (Completed)

## 2024-08-22 LAB
AMPHETAMINES UR QL SCN: NORMAL
BARBITURATES UR QL SCN: NORMAL
BENZODIAZ UR QL SCN: NORMAL
BZE UR QL SCN: NORMAL
CANNABINOIDS UR QL SCN: NORMAL
FENTANYL+NORFENTANYL UR QL SCN: NORMAL
METHADONE UR QL SCN: NORMAL
OPIATES UR QL SCN: NORMAL
OXYCODONE+OXYMORPHONE UR QL SCN: NORMAL
PCP UR QL SCN: NORMAL

## 2024-08-26 ENCOUNTER — TELEPHONE (OUTPATIENT)
Dept: PRIMARY CARE | Facility: CLINIC | Age: 69
End: 2024-08-26
Payer: MEDICARE

## 2024-08-26 DIAGNOSIS — E66.3 OVERWEIGHT WITH BODY MASS INDEX (BMI) 25.0-29.9: Primary | ICD-10-CM

## 2024-08-26 RX ORDER — PHENTERMINE HYDROCHLORIDE 37.5 MG/1
37.5 CAPSULE ORAL
Qty: 30 CAPSULE | Refills: 0 | Status: SHIPPED | OUTPATIENT
Start: 2024-08-26 | End: 2024-09-25

## 2024-08-26 ASSESSMENT — ENCOUNTER SYMPTOMS
DIARRHEA: 0
COLOR CHANGE: 0
SHORTNESS OF BREATH: 0
SEIZURES: 0
BLOOD IN STOOL: 0
DIZZINESS: 0
STRIDOR: 0
NERVOUS/ANXIOUS: 0
VOMITING: 0
POLYPHAGIA: 0
CONFUSION: 0
NAUSEA: 0
TREMORS: 0
EYE PAIN: 0
HEADACHES: 0
PHOTOPHOBIA: 0
UNEXPECTED WEIGHT CHANGE: 1
NECK PAIN: 0
FREQUENCY: 0
JOINT SWELLING: 0
WEAKNESS: 0
TROUBLE SWALLOWING: 0
SLEEP DISTURBANCE: 0
POLYDIPSIA: 0
MYALGIAS: 0
FEVER: 0
WHEEZING: 0
WOUND: 0
FACIAL ASYMMETRY: 0
ABDOMINAL PAIN: 0
COUGH: 0
HEMATURIA: 0
BACK PAIN: 0
VOICE CHANGE: 0
SPEECH DIFFICULTY: 0
ADENOPATHY: 0
APPETITE CHANGE: 0
FATIGUE: 0
CONSTIPATION: 0
FLANK PAIN: 0
PALPITATIONS: 0
ACTIVITY CHANGE: 0
HALLUCINATIONS: 0
CHEST TIGHTNESS: 0
DYSURIA: 0
SORE THROAT: 0
NUMBNESS: 0
ARTHRALGIAS: 0
SINUS PAIN: 0

## 2024-08-26 NOTE — TELEPHONE ENCOUNTER
Pt called stating current she is supposed to get a new prescription for phentermine but current pharmacy does not carry phentermine needs it to be sent to [facility] in [place].

## 2024-09-19 ENCOUNTER — APPOINTMENT (OUTPATIENT)
Dept: PRIMARY CARE | Facility: CLINIC | Age: 69
End: 2024-09-19
Payer: MEDICARE

## 2024-09-19 VITALS
HEART RATE: 76 BPM | DIASTOLIC BLOOD PRESSURE: 82 MMHG | BODY MASS INDEX: 28.29 KG/M2 | WEIGHT: 164.8 LBS | TEMPERATURE: 97.6 F | SYSTOLIC BLOOD PRESSURE: 118 MMHG

## 2024-09-19 DIAGNOSIS — E66.3 OVERWEIGHT WITH BODY MASS INDEX (BMI) 25.0-29.9: ICD-10-CM

## 2024-09-19 DIAGNOSIS — Z00.00 ROUTINE GENERAL MEDICAL EXAMINATION AT HEALTH CARE FACILITY: Primary | ICD-10-CM

## 2024-09-19 ASSESSMENT — PATIENT HEALTH QUESTIONNAIRE - PHQ9
2. FEELING DOWN, DEPRESSED OR HOPELESS: NOT AT ALL
1. LITTLE INTEREST OR PLEASURE IN DOING THINGS: NOT AT ALL
SUM OF ALL RESPONSES TO PHQ9 QUESTIONS 1 AND 2: 0

## 2024-09-19 ASSESSMENT — PAIN SCALES - GENERAL: PAINLEVEL: 0-NO PAIN

## 2024-09-21 RX ORDER — PHENTERMINE HYDROCHLORIDE 37.5 MG/1
37.5 CAPSULE ORAL
Qty: 30 CAPSULE | Refills: 0 | Status: SHIPPED | OUTPATIENT
Start: 2024-09-21 | End: 2024-10-21

## 2024-09-21 ASSESSMENT — ENCOUNTER SYMPTOMS
ARTHRALGIAS: 0
POLYPHAGIA: 0
ADENOPATHY: 0
FEVER: 0
SPEECH DIFFICULTY: 0
CHEST TIGHTNESS: 0
TREMORS: 0
CONSTIPATION: 0
SLEEP DISTURBANCE: 0
WOUND: 0
HEADACHES: 0
PALPITATIONS: 0
VOICE CHANGE: 0
STRIDOR: 0
DYSURIA: 0
SHORTNESS OF BREATH: 0
FLANK PAIN: 0
HALLUCINATIONS: 0
POLYDIPSIA: 0
SINUS PAIN: 0
ABDOMINAL PAIN: 0
PHOTOPHOBIA: 0
COLOR CHANGE: 0
BACK PAIN: 0
SORE THROAT: 0
NECK PAIN: 0
DIARRHEA: 0
CONFUSION: 0
FACIAL ASYMMETRY: 0
WHEEZING: 0
APPETITE CHANGE: 0
JOINT SWELLING: 0
HEMATURIA: 0
UNEXPECTED WEIGHT CHANGE: 0
MYALGIAS: 0
VOMITING: 0
COUGH: 0
FREQUENCY: 0
SEIZURES: 0
FATIGUE: 0
NUMBNESS: 0
TROUBLE SWALLOWING: 0
NAUSEA: 0
NERVOUS/ANXIOUS: 0
BLOOD IN STOOL: 0
EYE PAIN: 0
ACTIVITY CHANGE: 0
WEAKNESS: 0
DIZZINESS: 0

## 2024-09-21 ASSESSMENT — ANXIETY QUESTIONNAIRES
7. FEELING AFRAID AS IF SOMETHING AWFUL MIGHT HAPPEN: NOT AT ALL
2. NOT BEING ABLE TO STOP OR CONTROL WORRYING: NOT AT ALL
5. BEING SO RESTLESS THAT IT IS HARD TO SIT STILL: NOT AT ALL
1. FEELING NERVOUS, ANXIOUS, OR ON EDGE: NOT AT ALL
GAD7 TOTAL SCORE: 0
3. WORRYING TOO MUCH ABOUT DIFFERENT THINGS: NOT AT ALL
6. BECOMING EASILY ANNOYED OR IRRITABLE: NOT AT ALL
4. TROUBLE RELAXING: NOT AT ALL

## 2024-09-21 ASSESSMENT — ACTIVITIES OF DAILY LIVING (ADL)
STIL DRIVING: YES
DRESSING YOURSELF: INDEPENDENT
WALKS IN HOME: INDEPENDENT
FEEDING YOURSELF: INDEPENDENT
PATIENT'S MEMORY ADEQUATE TO SAFELY COMPLETE DAILY ACTIVITIES?: YES
NEEDS ASSISTANCE WITH FOOD: INDEPENDENT
BATHING: INDEPENDENT
TOILETING: INDEPENDENT
HEARING - LEFT EAR: FUNCTIONAL
PREPARING MEALS: INDEPENDENT
GROCERY SHOPPING: INDEPENDENT
DOING HOUSEWORK: INDEPENDENT
HEARING - RIGHT EAR: FUNCTIONAL
USING TELEPHONE: INDEPENDENT
TAKING MEDICATION: INDEPENDENT
MANAGING FINANCES: INDEPENDENT
JUDGMENT_ADEQUATE_SAFELY_COMPLETE_DAILY_ACTIVITIES: YES
GROOMING: INDEPENDENT
ADEQUATE_TO_COMPLETE_ADL: YES
USING TRANSPORTATION: INDEPENDENT
EATING: INDEPENDENT

## 2024-09-21 NOTE — ASSESSMENT & PLAN NOTE
Orders:    phentermine 37.5 mg capsule; Take 1 capsule (37.5 mg) by mouth once daily in the morning. Take before meals.

## 2024-09-21 NOTE — PROGRESS NOTES
Subjective   Reason for Visit: Rebeca Penn is an 69 y.o. female here for a Medicare Wellness visit.     Past Medical, Surgical, and Family History reviewed and updated in chart.    Reviewed all medications by prescribing practitioner or clinical pharmacist (such as prescriptions, OTCs, herbal therapies and supplements) and documented in the medical record.    HPI  Patient presented to the office for medicare wellness visit and also need medicine refill for weight loss. Phentermine is working well and she want to continue with the medicine.    OARRS:  Dino Meyer MD on 9/19/2024 10:56 AM  I have personally reviewed the OARRS report for Rebeca Penn. I have considered the risks of abuse, dependence, addiction and diversion and I believe that it is clinically appropriate for Rebeca Penn to be prescribed this medication    Is the patient prescribed a combination of a benzodiazepine and opioid?  No    Last Urine Drug Screen / ordered today: No  Recent Results (from the past 8760 hour(s))   Drug Screen, Urine With Reflex to Confirmation    Collection Time: 08/21/24 11:30 AM   Result Value Ref Range    Amphetamine Screen, Urine Presumptive Negative Presumptive Negative    Barbiturate Screen, Urine Presumptive Negative Presumptive Negative    Benzodiazepines Screen, Urine Presumptive Negative Presumptive Negative    Cannabinoid Screen, Urine Presumptive Negative Presumptive Negative    Cocaine Metabolite Screen, Urine Presumptive Negative Presumptive Negative    Fentanyl Screen, Urine Presumptive Negative Presumptive Negative    Opiate Screen, Urine Presumptive Negative Presumptive Negative    Oxycodone Screen, Urine Presumptive Negative Presumptive Negative    PCP Screen, Urine Presumptive Negative Presumptive Negative    Methadone Screen, Urine Presumptive Negative Presumptive Negative     Results are as expected.     Controlled Substance Agreement:  Date of the Last Agreement: 08/21/2024  Reviewed Controlled Substance  Agreement including but not limited to the benefits, risks, and alternatives to treatment with a Controlled Substance medication(s).    Anorexiants:   What is the patient's goal of therapy? To lose weight  Is this being achieved with current treatment? Yes    I have assessed the patient's continuing efforts to lose weight., I have assessed the patient's dedication to the treatment program and the response to treatment., and I have assessed the presence or absence of contraindications, adverse effects, and indicators of possible substance abuse that would necessitate cessation of treatment utilizing controlled substance.    Patient has demonstrated continued efforts to lose weight, is dedicated to the treatment program and the response to treatment. and I have assessed for the presence or absence of contraindications, adverse effects, and indicators of possible substance abuse that would necessitate cessation of treatment utilizing controlled substance.    Activities of Daily Living:   Is your overall impression that this patient is benefiting (symptom reduction outweighs side effects) from anorexiants therapy? Yes     1. Physical Functioning: Better  2. Family Relationship: Better  3. Social Relationship: Better  4. Mood: Better  5. Sleep Patterns: Better  6. Overall Function: Better    Patient Care Team:  Dino Meyer MD as PCP - General (Internal Medicine)  Dino Meyer MD as PCP - [facility] Attributed [facility]     Review of Systems   Constitutional:  Negative for activity change, appetite change, fatigue, fever and unexpected weight change.   HENT:  Negative for dental problem, ear discharge, hearing loss, nosebleeds, postnasal drip, sinus pain, sore throat, trouble swallowing and voice change.    Eyes:  Negative for photophobia, pain and visual disturbance.   Respiratory:  Negative for cough, chest tightness, shortness of breath, wheezing and stridor.    Cardiovascular:  Negative for chest pain, palpitations and  leg swelling.   Gastrointestinal:  Negative for abdominal pain, blood in stool, constipation, diarrhea, nausea and vomiting.   Endocrine: Negative for polydipsia, polyphagia and polyuria.   Genitourinary:  Negative for decreased urine volume, dyspareunia, dysuria, flank pain, frequency, genital sores, hematuria and urgency.   Musculoskeletal:  Negative for arthralgias, back pain, gait problem, joint swelling, myalgias and neck pain.   Skin:  Negative for color change, rash and wound.   Allergic/Immunologic: Negative for environmental allergies and food allergies.   Neurological:  Negative for dizziness, tremors, seizures, syncope, facial asymmetry, speech difficulty, weakness, numbness and headaches.   Hematological:  Negative for adenopathy.   Psychiatric/Behavioral:  Negative for behavioral problems, confusion, hallucinations, self-injury, sleep disturbance and suicidal ideas. The patient is not nervous/anxious.      Objective   Vitals:  /82   Pulse 76   Temp 36.4 °C (97.6 °F)   Wt 74.8 kg (164 lb 12.8 oz)   BMI 28.29 kg/m²       Physical Exam  Vitals and nursing note reviewed.   Constitutional:       General: She is not in acute distress.     Appearance: Normal appearance. She is not ill-appearing or toxic-appearing.   HENT:      Head: Normocephalic.      Nose: Nose normal.   Eyes:      Extraocular Movements: Extraocular movements intact.      Conjunctiva/sclera: Conjunctivae normal.      Pupils: Pupils are equal, round, and reactive to light.   Cardiovascular:      Rate and Rhythm: Normal rate and regular rhythm.      Pulses: Normal pulses.      Heart sounds: Normal heart sounds. No murmur heard.  Pulmonary:      Effort: Pulmonary effort is normal. No respiratory distress.      Breath sounds: Normal breath sounds. No stridor. No wheezing, rhonchi or rales.   Abdominal:      General: Bowel sounds are normal.      Palpations: Abdomen is soft.      Tenderness: There is no abdominal tenderness. There is no  right CVA tenderness or left CVA tenderness.   Musculoskeletal:         General: No swelling or deformity. Normal range of motion.      Cervical back: Normal range of motion and neck supple.   Skin:     General: Skin is warm.      Coloration: Skin is not jaundiced.      Findings: No bruising, erythema or rash.   Neurological:      General: No focal deficit present.      Mental Status: She is alert and oriented to person, place, and time.      Cranial Nerves: No cranial nerve deficit.      Gait: Gait normal.   Psychiatric:         Mood and Affect: Mood normal.         Behavior: Behavior normal.         Thought Content: Thought content normal.         Judgment: Judgment normal.       Assessment & Plan  Overweight with body mass index (BMI) 25.0-29.9    Orders:    phentermine 37.5 mg capsule; Take 1 capsule (37.5 mg) by mouth once daily in the morning. Take before meals.    Routine general medical examination at health care facility    Orders:    1 Year Follow Up In Primary Care - Wellness Exam; Future

## 2024-10-09 ENCOUNTER — APPOINTMENT (OUTPATIENT)
Dept: PRIMARY CARE | Facility: CLINIC | Age: 69
End: 2024-10-09
Payer: MEDICARE

## 2024-10-25 ENCOUNTER — APPOINTMENT (OUTPATIENT)
Dept: PRIMARY CARE | Facility: CLINIC | Age: 69
End: 2024-10-25
Payer: MEDICARE

## 2024-10-27 DIAGNOSIS — E66.3 OVERWEIGHT WITH BODY MASS INDEX (BMI) 25.0-29.9: ICD-10-CM

## 2024-10-27 RX ORDER — PHENTERMINE HYDROCHLORIDE 37.5 MG/1
37.5 CAPSULE ORAL
Qty: 30 CAPSULE | Refills: 0 | Status: SHIPPED | OUTPATIENT
Start: 2024-10-27 | End: 2024-10-31 | Stop reason: SDUPTHER

## 2024-10-31 DIAGNOSIS — E66.3 OVERWEIGHT WITH BODY MASS INDEX (BMI) 25.0-29.9: ICD-10-CM

## 2024-11-04 ENCOUNTER — HOSPITAL ENCOUNTER (OUTPATIENT)
Dept: RADIOLOGY | Facility: HOSPITAL | Age: 69
Discharge: HOME | End: 2024-11-04
Payer: MEDICARE

## 2024-11-04 VITALS — WEIGHT: 155 LBS | HEIGHT: 64 IN | BODY MASS INDEX: 26.46 KG/M2

## 2024-11-04 DIAGNOSIS — Z12.31 BREAST CANCER SCREENING BY MAMMOGRAM: ICD-10-CM

## 2024-11-04 PROCEDURE — 77067 SCR MAMMO BI INCL CAD: CPT | Mod: BILATERAL PROCEDURE | Performed by: RADIOLOGY

## 2024-11-04 PROCEDURE — 77067 SCR MAMMO BI INCL CAD: CPT

## 2024-11-04 PROCEDURE — 77063 BREAST TOMOSYNTHESIS BI: CPT | Mod: BILATERAL PROCEDURE | Performed by: RADIOLOGY

## 2024-11-04 RX ORDER — PHENTERMINE HYDROCHLORIDE 37.5 MG/1
37.5 CAPSULE ORAL
Qty: 30 CAPSULE | Refills: 0 | Status: SHIPPED | OUTPATIENT
Start: 2024-11-04 | End: 2024-12-04

## 2024-11-19 ENCOUNTER — APPOINTMENT (OUTPATIENT)
Dept: PRIMARY CARE | Facility: CLINIC | Age: 69
End: 2024-11-19
Payer: MEDICARE

## 2024-11-19 ENCOUNTER — LAB (OUTPATIENT)
Dept: LAB | Facility: LAB | Age: 69
End: 2024-11-19
Payer: MEDICARE

## 2024-11-19 VITALS
DIASTOLIC BLOOD PRESSURE: 88 MMHG | HEIGHT: 64 IN | TEMPERATURE: 96.8 F | OXYGEN SATURATION: 98 % | HEART RATE: 65 BPM | WEIGHT: 156 LBS | BODY MASS INDEX: 26.63 KG/M2 | SYSTOLIC BLOOD PRESSURE: 132 MMHG

## 2024-11-19 DIAGNOSIS — E66.3 OVERWEIGHT WITH BODY MASS INDEX (BMI) 25.0-29.9: ICD-10-CM

## 2024-11-19 DIAGNOSIS — E78.2 MIXED HYPERLIPIDEMIA: ICD-10-CM

## 2024-11-19 DIAGNOSIS — F41.9 ANXIETY: ICD-10-CM

## 2024-11-19 DIAGNOSIS — I10 PRIMARY HYPERTENSION: Primary | ICD-10-CM

## 2024-11-19 LAB
CHOLEST SERPL-MCNC: 136 MG/DL (ref 0–199)
CHOLESTEROL/HDL RATIO: 2.7
HDLC SERPL-MCNC: 50.4 MG/DL
LDLC SERPL CALC-MCNC: 67 MG/DL
NON HDL CHOLESTEROL: 86 MG/DL (ref 0–149)
TRIGL SERPL-MCNC: 94 MG/DL (ref 0–149)
VLDL: 19 MG/DL (ref 0–40)

## 2024-11-19 PROCEDURE — 99213 OFFICE O/P EST LOW 20 MIN: CPT | Performed by: INTERNAL MEDICINE

## 2024-11-19 PROCEDURE — 80061 LIPID PANEL: CPT

## 2024-11-19 PROCEDURE — 1036F TOBACCO NON-USER: CPT | Performed by: INTERNAL MEDICINE

## 2024-11-19 PROCEDURE — 3008F BODY MASS INDEX DOCD: CPT | Performed by: INTERNAL MEDICINE

## 2024-11-19 PROCEDURE — 1159F MED LIST DOCD IN RCRD: CPT | Performed by: INTERNAL MEDICINE

## 2024-11-19 PROCEDURE — 36415 COLL VENOUS BLD VENIPUNCTURE: CPT

## 2024-11-19 PROCEDURE — 3077F SYST BP >= 140 MM HG: CPT | Performed by: INTERNAL MEDICINE

## 2024-11-19 PROCEDURE — 1160F RVW MEDS BY RX/DR IN RCRD: CPT | Performed by: INTERNAL MEDICINE

## 2024-11-19 PROCEDURE — 3075F SYST BP GE 130 - 139MM HG: CPT | Performed by: INTERNAL MEDICINE

## 2024-11-19 PROCEDURE — 3080F DIAST BP >= 90 MM HG: CPT | Performed by: INTERNAL MEDICINE

## 2024-11-19 PROCEDURE — 3079F DIAST BP 80-89 MM HG: CPT | Performed by: INTERNAL MEDICINE

## 2024-11-19 PROCEDURE — G2211 COMPLEX E/M VISIT ADD ON: HCPCS | Performed by: INTERNAL MEDICINE

## 2024-11-19 RX ORDER — ESCITALOPRAM OXALATE 5 MG/1
5 TABLET ORAL DAILY
Qty: 30 TABLET | Refills: 2 | OUTPATIENT
Start: 2024-11-19 | End: 2025-02-17

## 2024-11-19 ASSESSMENT — ENCOUNTER SYMPTOMS
CHEST TIGHTNESS: 0
TROUBLE SWALLOWING: 0
UNEXPECTED WEIGHT CHANGE: 0
TREMORS: 0
WOUND: 0
COUGH: 0
WHEEZING: 0
VOICE CHANGE: 0
FLANK PAIN: 0
FACIAL ASYMMETRY: 0
FREQUENCY: 0
STRIDOR: 0
NERVOUS/ANXIOUS: 0
CONFUSION: 0
WEAKNESS: 0
HEADACHES: 0
ADENOPATHY: 0
ACTIVITY CHANGE: 0
BACK PAIN: 0
SHORTNESS OF BREATH: 0
JOINT SWELLING: 0
NAUSEA: 0
DIZZINESS: 0
MYALGIAS: 0
HALLUCINATIONS: 0
NECK PAIN: 0
HEMATURIA: 0
SORE THROAT: 0
SINUS PAIN: 0
PALPITATIONS: 0
SLEEP DISTURBANCE: 0
DYSURIA: 0
SPEECH DIFFICULTY: 0
SEIZURES: 0
NUMBNESS: 0
ABDOMINAL PAIN: 0
DIARRHEA: 0
VOMITING: 0
EYE PAIN: 0
FEVER: 0
FATIGUE: 0
BLOOD IN STOOL: 0
POLYDIPSIA: 0
COLOR CHANGE: 0
ARTHRALGIAS: 0
APPETITE CHANGE: 0
PHOTOPHOBIA: 0
POLYPHAGIA: 0
CONSTIPATION: 0

## 2024-11-19 ASSESSMENT — PATIENT HEALTH QUESTIONNAIRE - PHQ9
1. LITTLE INTEREST OR PLEASURE IN DOING THINGS: NOT AT ALL
2. FEELING DOWN, DEPRESSED OR HOPELESS: NOT AT ALL
SUM OF ALL RESPONSES TO PHQ9 QUESTIONS 1 AND 2: 0

## 2024-11-19 NOTE — ASSESSMENT & PLAN NOTE
She is on phentermine every other day and she lost 18 lbs from 3 months therapy and no side effects reported.

## 2024-11-19 NOTE — PROGRESS NOTES
Subjective   Patient ID: Rebeca Penn is a 69 y.o. female who presents for Follow-up (3 month follow up).    HPI   Patient presented to the office for 3 month follow up. She is taking all of her medication. Her  is on Amlodipine 5 mg oral tablet daily and he had allergies. He got another 90 pills and prescription from before. Patient said that she did not ask for refill because she is taking his medication but its exactly same medication and same dose so she is taking Amlodipine 5 mg oral tablet daily.    She lost 18 pounds after 3 month of treatment with phentermine and she is happy about it. She still has pills as she was taking every other day because of side effects and its working well for her.    Her BP is slightly high today and its 140/94. She said she has not changed her diet or any other thing.    Review of Systems   Constitutional:  Negative for activity change, appetite change, fatigue, fever and unexpected weight change.   HENT:  Negative for dental problem, ear discharge, hearing loss, nosebleeds, postnasal drip, sinus pain, sore throat, trouble swallowing and voice change.    Eyes:  Negative for photophobia, pain and visual disturbance.   Respiratory:  Negative for cough, chest tightness, shortness of breath, wheezing and stridor.    Cardiovascular:  Negative for chest pain, palpitations and leg swelling.   Gastrointestinal:  Negative for abdominal pain, blood in stool, constipation, diarrhea, nausea and vomiting.   Endocrine: Negative for polydipsia, polyphagia and polyuria.   Genitourinary:  Negative for decreased urine volume, dyspareunia, dysuria, flank pain, frequency, hematuria and urgency.   Musculoskeletal:  Negative for arthralgias, back pain, gait problem, joint swelling, myalgias and neck pain.   Skin:  Negative for color change, rash and wound.   Allergic/Immunologic: Negative for environmental allergies and food allergies.   Neurological:  Negative for dizziness, tremors, seizures,  "syncope, facial asymmetry, speech difficulty, weakness, numbness and headaches.   Hematological:  Negative for adenopathy.   Psychiatric/Behavioral:  Negative for behavioral problems, confusion, hallucinations, self-injury, sleep disturbance and suicidal ideas. The patient is not nervous/anxious.      Objective   BP (!) 140/94   Pulse 65   Temp 36 °C (96.8 °F)   Ht 1.626 m (5' 4\")   Wt 70.8 kg (156 lb)   SpO2 98%   BMI 26.78 kg/m²     Physical Exam  Vitals and nursing note reviewed.   Constitutional:       General: She is not in acute distress.     Appearance: Normal appearance. She is not ill-appearing or toxic-appearing.   HENT:      Head: Normocephalic and atraumatic.      Nose: Nose normal.   Eyes:      General:         Right eye: No discharge.         Left eye: No discharge.      Conjunctiva/sclera: Conjunctivae normal.      Pupils: Pupils are equal, round, and reactive to light.   Cardiovascular:      Rate and Rhythm: Normal rate and regular rhythm.      Pulses: Normal pulses.      Heart sounds: Normal heart sounds. No murmur heard.  Pulmonary:      Effort: Pulmonary effort is normal. No respiratory distress.      Breath sounds: Normal breath sounds. No stridor. No wheezing, rhonchi or rales.   Abdominal:      General: Bowel sounds are normal.      Tenderness: There is no abdominal tenderness.   Musculoskeletal:         General: No swelling or deformity. Normal range of motion.      Cervical back: Normal range of motion.   Skin:     General: Skin is warm.      Coloration: Skin is not jaundiced.      Findings: No bruising, erythema or rash.   Neurological:      General: No focal deficit present.      Mental Status: She is alert and oriented to person, place, and time.      Cranial Nerves: No cranial nerve deficit.      Gait: Gait normal.   Psychiatric:         Mood and Affect: Mood normal.         Behavior: Behavior normal.         Thought Content: Thought content normal.         Judgment: Judgment " normal.       Assessment/Plan   Problem List Items Addressed This Visit       Hyperlipidemia    Relevant Orders    Lipid Panel    Primary hypertension - Primary     BP is slightly high today and she is taking Amlodipine.         Overweight with body mass index (BMI) 25.0-29.9     She is on phentermine every other day and she lost 18 lbs from 3 months therapy and no side effects reported.          Anxiety    Relevant Medications    escitalopram (Lexapro) 5 mg tablet

## 2024-12-27 DIAGNOSIS — R10.84 GENERALIZED ABDOMINAL PAIN: ICD-10-CM

## 2024-12-27 DIAGNOSIS — F41.9 ANXIETY: ICD-10-CM

## 2024-12-27 RX ORDER — ESCITALOPRAM OXALATE 5 MG/1
5 TABLET ORAL DAILY
Qty: 90 TABLET | Refills: 2 | Status: SHIPPED | OUTPATIENT
Start: 2024-12-27 | End: 2025-09-23

## 2024-12-30 DIAGNOSIS — F41.9 ANXIETY: ICD-10-CM

## 2024-12-30 RX ORDER — ESCITALOPRAM OXALATE 5 MG/1
5 TABLET ORAL DAILY
Qty: 90 TABLET | Refills: 2 | Status: SHIPPED | OUTPATIENT
Start: 2024-12-30 | End: 2025-09-26

## 2025-02-06 DIAGNOSIS — R10.84 GENERALIZED ABDOMINAL PAIN: ICD-10-CM

## 2025-02-10 RX ORDER — HYDROGEN PEROXIDE 3 %
SOLUTION, NON-ORAL MISCELLANEOUS
Qty: 30 CAPSULE | Refills: 11 | Status: SHIPPED | OUTPATIENT
Start: 2025-02-10

## 2025-05-15 ENCOUNTER — TELEPHONE (OUTPATIENT)
Dept: PRIMARY CARE | Facility: CLINIC | Age: 70
End: 2025-05-15
Payer: MEDICARE

## 2025-05-19 ENCOUNTER — APPOINTMENT (OUTPATIENT)
Dept: PRIMARY CARE | Facility: CLINIC | Age: 70
End: 2025-05-19
Payer: MEDICARE

## 2025-05-19 VITALS
WEIGHT: 146.4 LBS | OXYGEN SATURATION: 97 % | HEIGHT: 63 IN | HEART RATE: 72 BPM | DIASTOLIC BLOOD PRESSURE: 80 MMHG | BODY MASS INDEX: 25.94 KG/M2 | TEMPERATURE: 96.5 F | SYSTOLIC BLOOD PRESSURE: 120 MMHG

## 2025-05-19 DIAGNOSIS — K21.9 GASTROESOPHAGEAL REFLUX DISEASE WITHOUT ESOPHAGITIS: ICD-10-CM

## 2025-05-19 DIAGNOSIS — E66.3 OVERWEIGHT WITH BODY MASS INDEX (BMI) 25.0-29.9: Primary | ICD-10-CM

## 2025-05-19 PROCEDURE — 3079F DIAST BP 80-89 MM HG: CPT | Performed by: INTERNAL MEDICINE

## 2025-05-19 PROCEDURE — 1159F MED LIST DOCD IN RCRD: CPT | Performed by: INTERNAL MEDICINE

## 2025-05-19 PROCEDURE — 3074F SYST BP LT 130 MM HG: CPT | Performed by: INTERNAL MEDICINE

## 2025-05-19 PROCEDURE — 3008F BODY MASS INDEX DOCD: CPT | Performed by: INTERNAL MEDICINE

## 2025-05-19 PROCEDURE — 1160F RVW MEDS BY RX/DR IN RCRD: CPT | Performed by: INTERNAL MEDICINE

## 2025-05-19 PROCEDURE — 99213 OFFICE O/P EST LOW 20 MIN: CPT | Performed by: INTERNAL MEDICINE

## 2025-05-19 PROCEDURE — 1036F TOBACCO NON-USER: CPT | Performed by: INTERNAL MEDICINE

## 2025-05-19 PROCEDURE — 1126F AMNT PAIN NOTED NONE PRSNT: CPT | Performed by: INTERNAL MEDICINE

## 2025-05-19 RX ORDER — PHENTERMINE HYDROCHLORIDE 15 MG/1
15 CAPSULE ORAL
Qty: 30 CAPSULE | Refills: 0 | Status: SHIPPED | OUTPATIENT
Start: 2025-05-19 | End: 2025-06-18

## 2025-05-19 ASSESSMENT — PAIN SCALES - GENERAL: PAINLEVEL_OUTOF10: 0-NO PAIN

## 2025-05-19 ASSESSMENT — ENCOUNTER SYMPTOMS
DIARRHEA: 0
SPEECH DIFFICULTY: 0
EYE PAIN: 0
CONFUSION: 0
NUMBNESS: 0
NECK PAIN: 0
POLYDIPSIA: 0
FACIAL ASYMMETRY: 0
POLYPHAGIA: 0
MYALGIAS: 0
NERVOUS/ANXIOUS: 0
CONSTIPATION: 0
STRIDOR: 0
TROUBLE SWALLOWING: 0
FEVER: 0
ACTIVITY CHANGE: 0
VOICE CHANGE: 0
HEMATURIA: 0
JOINT SWELLING: 0
WEAKNESS: 0
FLANK PAIN: 0
COUGH: 0
NAUSEA: 0
SHORTNESS OF BREATH: 0
UNEXPECTED WEIGHT CHANGE: 0
FATIGUE: 0
BLOOD IN STOOL: 0
PHOTOPHOBIA: 0
WOUND: 0
APPETITE CHANGE: 0
SORE THROAT: 0
SLEEP DISTURBANCE: 0
SINUS PAIN: 0
ABDOMINAL PAIN: 0
ADENOPATHY: 0
VOMITING: 0
PALPITATIONS: 0
SEIZURES: 0
WHEEZING: 0
DYSURIA: 0
DIZZINESS: 0
CHEST TIGHTNESS: 0
HALLUCINATIONS: 0
ARTHRALGIAS: 0
TREMORS: 0
COLOR CHANGE: 0
HEADACHES: 0
BACK PAIN: 0

## 2025-05-19 NOTE — PROGRESS NOTES
"Subjective   Patient ID: Rebeca Penn is a 69 y.o. female who presents for phentermine (Pain in upper left abdomin for 3 weeks pain is off and on).    HPI   Patient presented to the office to restart phentermine as its worked good last time but she want to go with lower dose.   She also has abdominal pain mostly in epigastrium. She is taking Nexium once a day.    Review of Systems   Constitutional:  Negative for activity change, appetite change, fatigue, fever and unexpected weight change.   HENT:  Negative for dental problem, ear discharge, hearing loss, nosebleeds, postnasal drip, sinus pain, sore throat, trouble swallowing and voice change.    Eyes:  Negative for photophobia, pain and visual disturbance.   Respiratory:  Negative for cough, chest tightness, shortness of breath, wheezing and stridor.    Cardiovascular:  Negative for chest pain, palpitations and leg swelling.   Gastrointestinal:  Negative for abdominal pain, blood in stool, constipation, diarrhea, nausea and vomiting.   Endocrine: Negative for polydipsia, polyphagia and polyuria.   Genitourinary:  Negative for decreased urine volume, dyspareunia, dysuria, flank pain, hematuria and urgency.   Musculoskeletal:  Negative for arthralgias, back pain, gait problem, joint swelling, myalgias and neck pain.   Skin:  Negative for color change, rash and wound.   Allergic/Immunologic: Negative for environmental allergies and food allergies.   Neurological:  Negative for dizziness, tremors, seizures, syncope, facial asymmetry, speech difficulty, weakness, numbness and headaches.   Hematological:  Negative for adenopathy.   Psychiatric/Behavioral:  Negative for behavioral problems, confusion, hallucinations, self-injury, sleep disturbance and suicidal ideas. The patient is not nervous/anxious.      Objective   /80   Pulse 72   Temp 35.8 °C (96.5 °F)   Ht 1.6 m (5' 2.99\")   Wt 66.4 kg (146 lb 6.4 oz)   SpO2 97%   BMI 25.94 kg/m²     Physical " OPERATIVE REPORT  PATIENT NAME: Amador Vences    :  1943  MRN: [id number]  Pt Location: [facility] OR ROOM 05    SURGERY DATE: 2017    Surgeon(s) and Role:     Bertha Hayden MD - Primary     * Nancy Dent DPM - Assisting    Preop Diagnosis:  Decubitus ulcer of heel, stage 3 [L89 603]    Post-Op Diagnosis Codes:     * Decubitus ulcer of heel, stage 3 [L89 603]    Procedure(s) (LRB):  AMPUTATION BELOW KNEE (BKA) (Left)    Specimen(s):  ID Type Source Tests Collected by Time Destination   1 : left lower leg(BKA) Tissue Leg, Left TISSUE EXAM Lexis Ott MD 2017 [id number]    A : proximal margin of left BKA bone amputation Tissue Bone ANAEROBIC CULTURE AND GRAM STAIN, CULTURE, TISSUE AND GRAM STAIN Lexis Ott MD 2017 [id number]        Estimated Blood Loss:   300 mL    Drains:  Urethral Catheter 18 Fr  (Active)   Reasons to continue Urinary Catheter  Chronic urinary catheter 2017  8:40 PM   Site Assessment Clean;Skin intact 2017  7:30 PM   Collection Container Standard drainage bag 2017  7:30 PM   Securement Method Securing device (Describe) 2017  7:30 PM   Output (mL) 850 mL 2017  5:00 AM   Number of days: 24       Anesthesia Type:   General    Operative Indications:  Decubitus ulcer of heel, stage 3 [L89 603]      Operative Findings:  Excellent bleeding at skin edges    Complications:   None    Procedure and Technique:  The procedure was performed under  General anesthesia  The patient  was placed supine  The left lower extremity was prepped  and draped in the usual sterile fashion  An occlusive dressing was  applied to the foot up to the level of the left ankle  A tourniquet was applied and Eschmarch bandage was used to   exanguinate the leg and then tourniquet was inflated to 300 mmHG  Anterior and posterior skin incisions were outlined with a  marking pen   The anterior skin incision was made 10 cm below  the tibial tuberosity and extended medially and laterally toward  the edges of Exam  Vitals and nursing note reviewed.   Constitutional:       General: She is not in acute distress.     Appearance: Normal appearance. She is not ill-appearing or toxic-appearing.   HENT:      Nose: Nose normal. No congestion.   Eyes:      Conjunctiva/sclera: Conjunctivae normal.   Pulmonary:      Effort: Pulmonary effort is normal.   Musculoskeletal:         General: No deformity. Normal range of motion.      Cervical back: Normal range of motion. No rigidity.   Neurological:      General: No focal deficit present.      Mental Status: She is alert and oriented to person, place, and time.      Coordination: Coordination normal.      Gait: Gait normal.   Psychiatric:         Mood and Affect: Mood normal.         Behavior: Behavior normal.       Assessment/Plan   Problem List Items Addressed This Visit       Overweight with body mass index (BMI) 25.0-29.9 - Primary    Relevant Medications    phentermine 15 mg capsule    Gastroesophageal reflux disease without esophagitis    Patient has been asked to take Nexium twice a day.        Return to office in 1 month for follow up.   the gastrocnemius muscle  The skin incision was then  extended distally on either side parallel to the tibia for 12 cm,  creating a posterior flap  The skin and subcutaneous tissues were  incised down to the fascia  The greater and short saphenous veins  on the medial and posterior aspects of the leg, respectively, were  ligated and divided  The fascia and the muscles were then divided  with the electrocautery at the same level of the anterior skin incision  The muscles in the anterior and lateral compartment were  divided, exposing the anterior tibial vessels, which were ligated  and divided  The interosseous membrane was then incised  The  tibial periosteum was incised circumferentially with the electrocautery  at the same level of the skin and muscle division  Using a  periosteal elevator, the tibial periosteum was stripped proximally  for 2 cm  The tibia was then transected with a electric saw  2 cm proximal to the skin incision with an anterior bevel  The fibula  was then exposed, dissected circumferentially, and transected  with a bone cutter 2 cm proximal to the tibial division  The amputation was then completed with an amputation knife,  transecting the soleus muscle obliquely and the gastrocnemius  muscle at the same level as the posterior flap  Bleeding soleal  veins and posterior tibial and peroneal vessels were clamped  and oversewn with 0 silk sutures  Sharp bony edges were then  filed, eliminating any bony prominences over the anterior aspect  of the tibia  The fascia of the anterior and posterior muscle flaps  were approximated with interrupted absorbable sutures  The skin  was approximated with skin staples  and dressed with 4 × 4 gauze, Kerlix, and an stockinet bandage  Knee immobilizer was placed    The patient tolerated the procedure well and was taken to the  postanesthesia care     I was present for the entire procedure    Patient Disposition:  PACU     SIGNATURE: Bolivar Murry MD  DATE: September 26, 2017  TIME: 7:07 PM

## 2025-05-26 ASSESSMENT — ENCOUNTER SYMPTOMS
VOMITING: 0
BELCHING: 0
MYALGIAS: 0
ABDOMINAL PAIN: 1
CONSTIPATION: 0
DIARRHEA: 0
ANOREXIA: 0
ARTHRALGIAS: 0
FREQUENCY: 0
HEMATOCHEZIA: 0
NAUSEA: 1
DYSURIA: 0
FLATUS: 1
FEVER: 0
WEIGHT LOSS: 1
HEMATURIA: 0
HEADACHES: 0

## 2025-05-28 ASSESSMENT — ENCOUNTER SYMPTOMS
WEIGHT LOSS: 1
FREQUENCY: 0
ARTHRALGIAS: 0
FEVER: 0
HEMATURIA: 0
DYSURIA: 0
ABDOMINAL PAIN: 1
MYALGIAS: 0
NAUSEA: 1
ANOREXIA: 0
CONSTIPATION: 0
HEMATOCHEZIA: 0
HEADACHES: 0
VOMITING: 0
FLATUS: 1
BELCHING: 0
DIARRHEA: 0

## 2025-05-29 ENCOUNTER — APPOINTMENT (OUTPATIENT)
Dept: PRIMARY CARE | Facility: CLINIC | Age: 70
End: 2025-05-29
Payer: MEDICARE

## 2025-05-29 DIAGNOSIS — K21.9 GASTROESOPHAGEAL REFLUX DISEASE WITHOUT ESOPHAGITIS: Primary | ICD-10-CM

## 2025-05-29 DIAGNOSIS — K44.9 HIATAL HERNIA: ICD-10-CM

## 2025-06-02 ENCOUNTER — APPOINTMENT (OUTPATIENT)
Dept: PRIMARY CARE | Facility: CLINIC | Age: 70
End: 2025-06-02
Payer: MEDICARE

## 2025-06-19 ENCOUNTER — APPOINTMENT (OUTPATIENT)
Dept: PRIMARY CARE | Facility: CLINIC | Age: 70
End: 2025-06-19
Payer: MEDICARE

## 2025-06-19 ENCOUNTER — OFFICE VISIT (OUTPATIENT)
Dept: PRIMARY CARE | Facility: CLINIC | Age: 70
End: 2025-06-19
Payer: MEDICARE

## 2025-06-19 VITALS
OXYGEN SATURATION: 97 % | HEART RATE: 85 BPM | HEIGHT: 64 IN | WEIGHT: 145 LBS | BODY MASS INDEX: 24.75 KG/M2 | DIASTOLIC BLOOD PRESSURE: 82 MMHG | TEMPERATURE: 97 F | SYSTOLIC BLOOD PRESSURE: 130 MMHG

## 2025-06-19 DIAGNOSIS — E66.3 OVERWEIGHT WITH BODY MASS INDEX (BMI) 25.0-29.9: ICD-10-CM

## 2025-06-19 PROCEDURE — 3075F SYST BP GE 130 - 139MM HG: CPT | Performed by: INTERNAL MEDICINE

## 2025-06-19 PROCEDURE — 99212 OFFICE O/P EST SF 10 MIN: CPT | Performed by: INTERNAL MEDICINE

## 2025-06-19 PROCEDURE — 1160F RVW MEDS BY RX/DR IN RCRD: CPT | Performed by: INTERNAL MEDICINE

## 2025-06-19 PROCEDURE — 1036F TOBACCO NON-USER: CPT | Performed by: INTERNAL MEDICINE

## 2025-06-19 PROCEDURE — 3008F BODY MASS INDEX DOCD: CPT | Performed by: INTERNAL MEDICINE

## 2025-06-19 PROCEDURE — 1159F MED LIST DOCD IN RCRD: CPT | Performed by: INTERNAL MEDICINE

## 2025-06-19 PROCEDURE — 3079F DIAST BP 80-89 MM HG: CPT | Performed by: INTERNAL MEDICINE

## 2025-06-19 RX ORDER — PHENTERMINE HYDROCHLORIDE 30 MG/1
30 CAPSULE ORAL
Qty: 30 CAPSULE | Refills: 0 | Status: SHIPPED | OUTPATIENT
Start: 2025-06-19 | End: 2025-07-19

## 2025-06-19 NOTE — PROGRESS NOTES
"Subjective   Patient ID: Rebeca Penn is a 69 y.o. female who presents for No chief complaint on file..    HPI   Patient presented to the office to get medicine refill, phentermine.   Weight loss is there but very slow. Patient BMI is normal now.     Review of Systems   Constitutional:  Negative for activity change, appetite change, fatigue, fever and unexpected weight change.   HENT:  Negative for congestion, nosebleeds, postnasal drip and sore throat.    Eyes:  Negative for photophobia, pain and visual disturbance.   Respiratory:  Negative for cough, chest tightness, shortness of breath, wheezing and stridor.    Cardiovascular:  Negative for chest pain, palpitations and leg swelling.   Gastrointestinal:  Negative for abdominal pain, constipation, diarrhea and nausea.   Endocrine: Negative for polyuria.   Genitourinary:  Negative for decreased urine volume, dysuria, frequency, hematuria and urgency.   Musculoskeletal:  Negative for arthralgias, back pain, gait problem, joint swelling, myalgias and neck pain.   Skin:  Negative for color change, rash and wound.   Neurological:  Negative for dizziness, tremors, seizures, syncope, facial asymmetry, weakness, numbness and headaches.   Hematological:  Negative for adenopathy.   Psychiatric/Behavioral:  Negative for behavioral problems, confusion, hallucinations and sleep disturbance. The patient is not nervous/anxious.      Objective   /82   Pulse 85   Temp 36.1 °C (97 °F)   Ht 1.626 m (5' 4\")   Wt 65.8 kg (145 lb)   SpO2 97%   BMI 24.89 kg/m²     Physical Exam  Vitals and nursing note reviewed.   Constitutional:       General: She is not in acute distress.     Appearance: Normal appearance. She is not ill-appearing or toxic-appearing.   HENT:      Nose: Nose normal. No congestion.   Eyes:      Conjunctiva/sclera: Conjunctivae normal.   Pulmonary:      Effort: Pulmonary effort is normal.   Musculoskeletal:         General: No deformity. Normal range of " motion.      Cervical back: Normal range of motion.   Neurological:      General: No focal deficit present.      Mental Status: She is alert and oriented to person, place, and time.      Coordination: Coordination normal.      Gait: Gait normal.   Psychiatric:         Mood and Affect: Mood normal.         Behavior: Behavior normal.       Assessment/Plan   Problem List Items Addressed This Visit       Overweight with body mass index (BMI) 25.0-29.9    Relevant Medications    phentermine 30 mg capsule

## 2025-06-20 ASSESSMENT — ENCOUNTER SYMPTOMS
APPETITE CHANGE: 0
FEVER: 0
SLEEP DISTURBANCE: 0
SORE THROAT: 0
STRIDOR: 0
ACTIVITY CHANGE: 0
ARTHRALGIAS: 0
CONSTIPATION: 0
SHORTNESS OF BREATH: 0
ABDOMINAL PAIN: 0
FACIAL ASYMMETRY: 0
DIARRHEA: 0
NECK PAIN: 0
TREMORS: 0
CHEST TIGHTNESS: 0
FATIGUE: 0
WOUND: 0
HALLUCINATIONS: 0
NAUSEA: 0
HEADACHES: 0
PALPITATIONS: 0
PHOTOPHOBIA: 0
DYSURIA: 0
WHEEZING: 0
DIZZINESS: 0
MYALGIAS: 0
COLOR CHANGE: 0
NERVOUS/ANXIOUS: 0
FREQUENCY: 0
COUGH: 0
ADENOPATHY: 0
UNEXPECTED WEIGHT CHANGE: 0
WEAKNESS: 0
HEMATURIA: 0
CONFUSION: 0
SEIZURES: 0
NUMBNESS: 0
EYE PAIN: 0
JOINT SWELLING: 0
BACK PAIN: 0

## 2025-07-07 ENCOUNTER — APPOINTMENT (OUTPATIENT)
Facility: CLINIC | Age: 70
End: 2025-07-07
Payer: MEDICARE

## 2025-07-07 VITALS — BODY MASS INDEX: 25.52 KG/M2 | OXYGEN SATURATION: 93 % | WEIGHT: 144 LBS | HEIGHT: 63 IN | HEART RATE: 66 BPM

## 2025-07-07 DIAGNOSIS — R10.12 LEFT UPPER QUADRANT ABDOMINAL PAIN: ICD-10-CM

## 2025-07-07 DIAGNOSIS — R14.0 ABDOMINAL BLOATING: ICD-10-CM

## 2025-07-07 DIAGNOSIS — K21.9 GASTROESOPHAGEAL REFLUX DISEASE WITHOUT ESOPHAGITIS: Primary | ICD-10-CM

## 2025-07-07 DIAGNOSIS — K44.9 HIATAL HERNIA: ICD-10-CM

## 2025-07-07 DIAGNOSIS — Z12.11 ENCOUNTER FOR SCREENING COLONOSCOPY: ICD-10-CM

## 2025-07-07 DIAGNOSIS — R13.19 ESOPHAGEAL DYSPHAGIA: ICD-10-CM

## 2025-07-07 PROCEDURE — 1160F RVW MEDS BY RX/DR IN RCRD: CPT | Performed by: STUDENT IN AN ORGANIZED HEALTH CARE EDUCATION/TRAINING PROGRAM

## 2025-07-07 PROCEDURE — 3008F BODY MASS INDEX DOCD: CPT | Performed by: STUDENT IN AN ORGANIZED HEALTH CARE EDUCATION/TRAINING PROGRAM

## 2025-07-07 PROCEDURE — 1159F MED LIST DOCD IN RCRD: CPT | Performed by: STUDENT IN AN ORGANIZED HEALTH CARE EDUCATION/TRAINING PROGRAM

## 2025-07-07 PROCEDURE — 1036F TOBACCO NON-USER: CPT | Performed by: STUDENT IN AN ORGANIZED HEALTH CARE EDUCATION/TRAINING PROGRAM

## 2025-07-07 PROCEDURE — 99214 OFFICE O/P EST MOD 30 MIN: CPT | Performed by: STUDENT IN AN ORGANIZED HEALTH CARE EDUCATION/TRAINING PROGRAM

## 2025-07-07 RX ORDER — PANTOPRAZOLE SODIUM 40 MG/1
40 TABLET, DELAYED RELEASE ORAL DAILY
Qty: 30 TABLET | Refills: 11 | Status: SHIPPED | OUTPATIENT
Start: 2025-07-07 | End: 2026-07-07

## 2025-07-07 ASSESSMENT — ENCOUNTER SYMPTOMS
CONSTIPATION: 0
UNEXPECTED WEIGHT CHANGE: 0
BLOOD IN STOOL: 0
ABDOMINAL PAIN: 1
ANAL BLEEDING: 0
RECTAL PAIN: 0
DIARRHEA: 0
VOMITING: 0
NAUSEA: 0
TROUBLE SWALLOWING: 1
CHILLS: 0
SHORTNESS OF BREATH: 0
ABDOMINAL DISTENTION: 1
FEVER: 0
COLOR CHANGE: 0

## 2025-07-07 NOTE — ASSESSMENT & PLAN NOTE
Chronic intermittent LUQ pain with intermittent dysphagia. EGD normal 2024 including esophageal, gastric and duodenal bx. RUQ US and HIDA normal. Colonoscopy normal 2020.   Sxs could be related to IBS vs constipation vs FODMAPs vs SIBO.  - KUB to assess for constipation  - trial of low FODMAP for bloating triggers  - c/w fiber  - consider manometry if more bothersome dysphagia, patient declines at this time  - trial of alternative PPI to see if this helps phlegm, throat clearing and dysphagia which all can be due to GERD  - f.up with ENT regarding phlegm and throat clearing   - consider trial of TCA for possible IBS based on above, patient reports a lot of anxiety related to medical conditions of loved ones. She is on Lexapro 5mg       Orders:    Referral to Gastroenterology    pantoprazole (ProtoNix) 40 mg EC tablet; Take 1 tablet (40 mg) by mouth once daily. Do not crush, chew, or split.    XR abdomen 1 view; Future    Follow Up In Gastroenterology; Future

## 2025-07-07 NOTE — PATIENT INSTRUCTIONS
Stop Nexium and trial pantoprazole 40mg once daily, 30 min before your 1st meal    Bloating can come from many places and can be completely normal. Some causes of bloating include your diet, SIBO, celiac disease, H.pylori infection, constipation and FODMAPs.   You were checked for celiac and H,pylori during your endoscopy. Review FODMAP list for triggers of bloating.    Get an abdominal xray to evaluate for constipation

## 2025-07-07 NOTE — PROGRESS NOTES
Chief Complaint:  Chief Complaint   Patient presents with    Abdominal Pain         Rebeca Penn is a 69 y.o. year old female patient with  Medical History[1]   referred for abdominal pain. Prior patient of [facility] last seen 12/2023.     LUQ pain which is chronic, intermittently for years. Described as pressure.   Sometimes worse after eating.   Sometimes worse with standing  No nocturnal sxs     Bloating with abdominal distention, a few times a month. Does not wake up feeling bloated  No specific triggers    Also with phlegm and throat clearing. Saw ENT years ago     Thinks she has esophageal spasms, when eating has chest pain. Waits and it resolves  Has dysphagia related to this but between episodes denies dysphagia or odynophagia.    Has GERD symptoms a few times a month. On nexium 20mg daily  If she misses a few days she has symptoms  Some regurgitation and hiccups     BM daily 3-4-5  Good stool volume   Uses fiber gummies every other day, 3 gummies every other day    Fruit: 2/day  Veggies: 1/day  Whole Grains: 0/day  Water: a couple 12 ounce bottles      Prior work-up with Normal RUQ US 2023 and  HIDA scan 1/2024   Colonoscopy 2020 for screening with , normal, report reviewed, Recall 10 yrs   EGD done 2/2024 for generalized abdominal pain,dysphagia.   A. DUODENUM SECOND PART BIOPSY:   Small bowel mucosa with no significant pathologic abnormality     B. STOMACH ANTRUM BIOPSY:   Gastric mucosa with mild chronic inflammation  No Helicobacter pylori organisms identified on H&E stain     C. STOMACH ANTRUM  POLYPECTOMY:   Gastric mucosa with mild chronic inflammation  No Helicobacter pylori organisms identified on H&E stain     D. ESOPHAGUS MID BIOPSY:   Squamous mucosa with no significant pathologic abnormality        Review of Systems   Constitutional:  Negative for chills, fever and unexpected weight change.   HENT:  Positive for trouble swallowing.    Respiratory:  Negative for shortness of  "breath.    Cardiovascular:  Negative for chest pain.   Gastrointestinal:  Positive for abdominal distention and abdominal pain. Negative for anal bleeding, blood in stool, constipation, diarrhea, nausea, rectal pain and vomiting.   Skin:  Negative for color change.     Family History[2]    Medications  Current Medications[3]    Vitals  Pulse 66   Ht 1.6 m (5' 3\")   Wt 65.3 kg (144 lb)   SpO2 93%   BMI 25.51 kg/m²     Physical Exam  Constitutional:       General: She is not in acute distress.  HENT:      Head: Normocephalic and atraumatic.      Mouth/Throat:      Mouth: Mucous membranes are moist.      Pharynx: No oropharyngeal exudate or posterior oropharyngeal erythema.   Eyes:      General: No scleral icterus.     Conjunctiva/sclera: Conjunctivae normal.   Cardiovascular:      Rate and Rhythm: Normal rate.      Heart sounds: Normal heart sounds.   Pulmonary:      Effort: Pulmonary effort is normal.      Breath sounds: No wheezing.   Abdominal:      General: Bowel sounds are normal. There is no distension.      Palpations: Abdomen is soft.      Tenderness: There is no abdominal tenderness. There is no guarding or rebound.      Hernia: No hernia is present.   Musculoskeletal:      Cervical back: Normal range of motion.   Skin:     General: Skin is warm.      Coloration: Skin is not jaundiced.   Neurological:      General: No focal deficit present.      Mental Status: She is alert and oriented to person, place, and time.   Psychiatric:         Mood and Affect: Mood normal.           Labs:  No results found for: \"AFP\"No results found for: \"ASMAB\", \"MITOAB\"No results found for: \"MINESH\"No results found for: \"ASMAB\", \"MITOAB\"No results found for: \"[id number]\"  Lab Results   Component Value Date    HEPCAB NONREACTIVE 09/13/2023     Lab Results   Component Value Date    HEPCAB NONREACTIVE 09/13/2023   No results found for: \"HIV1X2\"No results found for: \"IRON\", \"TIBC\", \"FERRITIN\"  Lab Results   Component Value Date    INR " 0.9 04/25/2018    PROTIME 10.0 04/25/2018     Lab Results   Component Value Date    TSH 2.14 04/29/2024     Computed MELD 3.0 unavailable. One or more values for this score either were not found within the given timeframe or did not fit some other criterion.  Computed MELD-Na unavailable. One or more values for this score either were not found within the given timeframe or did not fit some other criterion.     Computed FIB-4 Calculation unavailable. One or more values for this score either were not found within the given timeframe or did not fit some other criterion.     Radiology       Narrative & Impression   Interpreted By:  Gume Flores,   STUDY:  NM HEPATOBILIARY W CHOLECYSTOKININ; 1/4/2024 11:54 am      INDICATION:  Signs/Symptoms:RUQ pain.      COMPARISON:  None      ACCESSION NUMBER(S):  [id number]      ORDERING CLINICIAN:  TANO POWER      TECHNIQUE:  Static images of the right upper quadrant were obtained after the  intravenous injection of 6 mCi of technetium Choletec. The  gallbladder ejection fraction was also calculated after the  intravenous injection of 1.5 mcg of CCK.      FINDINGS:  There is prompt visualization the biliary tree including the  gallbladder. There is also small bowel visualization. The gallbladder  ejection fraction is 97 percent which is in the normal range. (normal  range is greater than 35%).      IMPRESSION:  No pathologic findings are identified. No evidence for cystic duct  obstruction. The gallbladder ejection fraction is normal   US gallbladder  Status: Final result     PACS Images     Show images for US gallbladder  Signed by    Signed Time Phone Pager   January Askew MD 11/08/2023 11:35 [phone]     Exam Information    Status Exam Begun Exam Ended   Final 11/08/2023 10:57 11/08/2023 11:18     Study Result    Narrative & Impression   Interpreted By:  January Askew,   STUDY:  US GALLBLADDER;  11/8/2023 11:18 am      INDICATION:  Signs/Symptoms:ruq  pain.      COMPARISON:  None.      ACCESSION NUMBER(S):  [id number]      ORDERING CLINICIAN:  VINEET NORRIS      TECHNIQUE:  Multiple images of the abdomen were obtained.      FINDINGS:  LIVER:  The echogenicity of the liver is within normal limits.  There is no hepatic mass.  The sagittal dimension of the right lobe of the liver is 13.7 cm,  nonenlarged.      GALLBLADDER:  The gallbladder is nondistended.  The gallbladder wall is not thickened.  There are no gallstones.  There is no sludge.  There is no pericholecystic fluid.          BILE DUCTS:  There is no intrahepatic biliary dilatation.  The common bile duct is  nondilated measuring 0.3 cm.      PANCREAS:  The pancreas is unremarkable.      RIGHT KIDNEY:  The right kidney is  normal in size measuring 9.5 cm in length.      The echogenicity of the cortex is within normal limits.  There is no renal mass.  There is no intrarenal calculus or hydronephrosis.      IMPRESSION:  Unremarkable right upper quadrant ultrasound.      MACRO:  None      Signed by: January Askew 11/8/2023 11:35 AM  Dictation workstation:   [id number]       A/P   Rebeca was seen today for abdominal pain.  Diagnoses and all orders for this visit:  Gastroesophageal reflux disease without esophagitis  -     Referral to Gastroenterology  Hiatal hernia  -     Referral to Gastroenterology     Assessment & Plan  Gastroesophageal reflux disease without esophagitis  Hiatal hernia  Left upper quadrant abdominal pain  Abdominal bloating  Esophageal dysphagia    Chronic intermittent LUQ pain with intermittent dysphagia. EGD normal 2024 including esophageal, gastric and duodenal bx. RUQ US and HIDA normal. Colonoscopy normal 2020.   Sxs could be related to IBS vs constipation vs FODMAPs vs SIBO.  - KUB to assess for constipation  - trial of low FODMAP for bloating triggers  - c/w fiber  - consider manometry if more bothersome dysphagia, patient declines at this time  - trial of alternative PPI to see if  this helps phlegm, throat clearing and dysphagia which all can be due to GERD  - f.up with ENT regarding phlegm and throat clearing   - consider trial of TCA for possible IBS based on above, patient reports a lot of anxiety related to medical conditions of loved ones. She is on Lexapro 5mg       Orders:    Referral to Gastroenterology    pantoprazole (ProtoNix) 40 mg EC tablet; Take 1 tablet (40 mg) by mouth once daily. Do not crush, chew, or split.    XR abdomen 1 view; Future    Follow Up In Gastroenterology; Future    Encounter for screening colonoscopy  Due 2030 or sooner based on above                 [1]   Past Medical History:  Diagnosis Date    Acute tonsillitis 08/21/2024    Anxiety 2020    Rx Lexapro    Arthritis 2024    Breast cyst     Breast injury     Colon polyp     Eating disorder     Generalized abdominal pain     GERD (gastroesophageal reflux disease) 2012    Hiatal hernia     HLD (hyperlipidemia)     HTN (hypertension)     Left breast mass     Migraine 02/20/2023    Pain in unspecified foot 06/09/2020    Foot pain    Pain in unspecified foot 06/09/2020    Foot pain    Personal history of other diseases of the digestive system 11/01/2019    History of gastroesophageal reflux (GERD)    Personal history of other endocrine, nutritional and metabolic disease 10/28/2019    History of hyperlipidemia    Personal history of other infectious and parasitic diseases 07/16/2020    History of herpes zoster   [2]   Family History  Problem Relation Name Age of Onset    Diabetes Mother Zully     Other (cardiac disorder) Father Horlacher     Heart disease Father Horlacher     Diabetes Father Horlacher     Breast cancer Sister Nitza 60   [3]   Current Outpatient Medications:     amLODIPine (Norvasc) 5 mg tablet, Take 1 tablet (5 mg) by mouth once daily., Disp: 90 tablet, Rfl: 1    atorvastatin (Lipitor) 80 mg tablet, Take 1 tablet (80 mg) by mouth once daily., Disp: 90 tablet, Rfl: 2    calcium carbonate (CALCIUM 600  ORAL), , Disp: , Rfl:     escitalopram (Lexapro) 5 mg tablet, Take 1 tablet (5 mg) by mouth once daily., Disp: 90 tablet, Rfl: 2    esomeprazole (NexIUM) 20 mg DR capsule, TAKE 1 CAPSULE BY MOUTH ONCE DAILY. do not open capsule., Disp: 30 capsule, Rfl: 11    ibuprofen (Motrin) capsule, Take 2 capsules (400 mg) by mouth every 6 hours if needed., Disp: , Rfl:     phentermine 30 mg capsule, Take 1 capsule (30 mg) by mouth once daily in the morning. Take before meals., Disp: 30 capsule, Rfl: 0

## 2025-07-24 DIAGNOSIS — E78.2 MIXED HYPERLIPIDEMIA: ICD-10-CM

## 2025-07-24 RX ORDER — ATORVASTATIN CALCIUM 80 MG/1
80 TABLET, FILM COATED ORAL DAILY
Qty: 90 TABLET | Refills: 2 | Status: SHIPPED | OUTPATIENT
Start: 2025-07-24

## 2025-08-13 ENCOUNTER — APPOINTMENT (OUTPATIENT)
Facility: CLINIC | Age: 70
End: 2025-08-13
Payer: MEDICARE

## 2025-09-23 ENCOUNTER — APPOINTMENT (OUTPATIENT)
Dept: PRIMARY CARE | Facility: CLINIC | Age: 70
End: 2025-09-23
Payer: MEDICARE

## 2025-10-07 ENCOUNTER — APPOINTMENT (OUTPATIENT)
Facility: CLINIC | Age: 70
End: 2025-10-07
Payer: MEDICARE